# Patient Record
Sex: FEMALE | Race: WHITE | Employment: UNEMPLOYED | URBAN - NONMETROPOLITAN AREA
[De-identification: names, ages, dates, MRNs, and addresses within clinical notes are randomized per-mention and may not be internally consistent; named-entity substitution may affect disease eponyms.]

---

## 2017-06-02 ENCOUNTER — HOSPITAL ENCOUNTER (EMERGENCY)
Age: 27
Discharge: HOME | End: 2017-06-02
Attending: EMERGENCY MEDICINE
Payer: COMMERCIAL

## 2017-06-02 ENCOUNTER — APPOINTMENT (OUTPATIENT)
Dept: GENERAL RADIOLOGY | Age: 27
End: 2017-06-02
Payer: COMMERCIAL

## 2017-06-02 VITALS
RESPIRATION RATE: 24 BRPM | WEIGHT: 160 LBS | OXYGEN SATURATION: 100 % | TEMPERATURE: 98.8 F | DIASTOLIC BLOOD PRESSURE: 90 MMHG | SYSTOLIC BLOOD PRESSURE: 111 MMHG | BODY MASS INDEX: 28.34 KG/M2 | HEART RATE: 92 BPM

## 2017-06-02 DIAGNOSIS — J45.901 ASTHMA EXACERBATION: Primary | ICD-10-CM

## 2017-06-02 PROCEDURE — 6370000000 HC RX 637 (ALT 250 FOR IP): Performed by: EMERGENCY MEDICINE

## 2017-06-02 PROCEDURE — 94664 DEMO&/EVAL PT USE INHALER: CPT

## 2017-06-02 PROCEDURE — 99285 EMERGENCY DEPT VISIT HI MDM: CPT

## 2017-06-02 PROCEDURE — 71020 XR CHEST STANDARD TWO VW: CPT

## 2017-06-02 RX ORDER — IPRATROPIUM BROMIDE AND ALBUTEROL SULFATE 2.5; .5 MG/3ML; MG/3ML
2 SOLUTION RESPIRATORY (INHALATION) ONCE
Status: COMPLETED | OUTPATIENT
Start: 2017-06-02 | End: 2017-06-02

## 2017-06-02 RX ORDER — PREDNISONE 50 MG/1
50 TABLET ORAL DAILY
Qty: 4 TABLET | Refills: 0 | Status: SHIPPED | OUTPATIENT
Start: 2017-06-02 | End: 2018-12-12 | Stop reason: ALTCHOICE

## 2017-06-02 RX ORDER — PREDNISONE 20 MG/1
60 TABLET ORAL ONCE
Status: COMPLETED | OUTPATIENT
Start: 2017-06-02 | End: 2017-06-02

## 2017-06-02 RX ADMIN — IPRATROPIUM BROMIDE AND ALBUTEROL SULFATE 2 AMPULE: .5; 3 SOLUTION RESPIRATORY (INHALATION) at 12:53

## 2017-06-02 RX ADMIN — PREDNISONE 60 MG: 20 TABLET ORAL at 13:01

## 2017-06-02 ASSESSMENT — ENCOUNTER SYMPTOMS
ABDOMINAL PAIN: 0
TROUBLE SWALLOWING: 0
WHEEZING: 0
VOMITING: 0
NAUSEA: 0
PHOTOPHOBIA: 0
CHEST TIGHTNESS: 0
SHORTNESS OF BREATH: 1
FACIAL SWELLING: 0
BLOOD IN STOOL: 0
DIARRHEA: 0
SORE THROAT: 0
VOICE CHANGE: 0
COUGH: 0
BACK PAIN: 0

## 2017-06-02 ASSESSMENT — PAIN DESCRIPTION - DESCRIPTORS: DESCRIPTORS: ACHING

## 2017-06-02 ASSESSMENT — PAIN DESCRIPTION - LOCATION: LOCATION: CHEST

## 2017-06-02 ASSESSMENT — PAIN SCALES - GENERAL: PAINLEVEL_OUTOF10: 6

## 2017-06-02 ASSESSMENT — PAIN DESCRIPTION - PAIN TYPE: TYPE: ACUTE PAIN

## 2017-06-02 ASSESSMENT — PAIN DESCRIPTION - ORIENTATION: ORIENTATION: MID;ANTERIOR

## 2017-07-03 ENCOUNTER — HOSPITAL ENCOUNTER (OUTPATIENT)
Dept: NUTRITION | Age: 27
Discharge: HOME | End: 2017-07-03
Payer: COMMERCIAL

## 2017-07-03 PROCEDURE — 97802 MEDICAL NUTRITION INDIV IN: CPT

## 2017-07-05 VITALS — WEIGHT: 188.2 LBS | BODY MASS INDEX: 33.35 KG/M2 | HEIGHT: 63 IN

## 2018-11-05 ENCOUNTER — HOSPITAL ENCOUNTER (OUTPATIENT)
Age: 28
Discharge: HOME | End: 2018-11-05
Payer: COMMERCIAL

## 2018-11-05 LAB
-: ABNORMAL
ABO, EXTERNAL RESULT: NORMAL
ABO/RH: NORMAL
ABSOLUTE EOS #: 0.22 K/UL (ref 0–0.44)
ABSOLUTE IMMATURE GRANULOCYTE: 0.03 K/UL (ref 0–0.3)
ABSOLUTE LYMPH #: 2.06 K/UL (ref 1.1–3.7)
ABSOLUTE MONO #: 0.57 K/UL (ref 0.1–1.2)
AMORPHOUS: ABNORMAL
ANTIBODY SCREEN: NEGATIVE
BACTERIA: ABNORMAL
BASOPHILS # BLD: 0 % (ref 0–2)
BASOPHILS ABSOLUTE: <0.03 K/UL (ref 0–0.2)
BILIRUBIN URINE: NEGATIVE
C. TRACHOMATIS, EXTERNAL RESULT: NEGATIVE
CASTS UA: ABNORMAL /LPF
COLOR: YELLOW
COMMENT UA: ABNORMAL
CRYSTALS, UA: ABNORMAL /HPF
DIFFERENTIAL TYPE: ABNORMAL
EOSINOPHILS RELATIVE PERCENT: 3 % (ref 1–4)
EPITHELIAL CELLS UA: ABNORMAL /HPF (ref 0–25)
ESTIMATED AVERAGE GLUCOSE: 85 MG/DL
FREE THYROXINE INDEX: 13.2 UG/DL (ref 5–11)
GLUCOSE ADMINISTRATION: NORMAL
GLUCOSE TOLERANCE SCREEN 50G: 94 MG/DL (ref 70–135)
GLUCOSE URINE: NEGATIVE
HBA1C MFR BLD: 4.6 % (ref 4.8–5.9)
HCT VFR BLD CALC: 38.7 % (ref 36.3–47.1)
HEMOGLOBIN: 12.5 G/DL (ref 11.9–15.1)
HEP B, EXTERNAL RESULT: NONREACTIVE
HEPATITIS B SURFACE ANTIGEN: NONREACTIVE
HIV AG/AB: NONREACTIVE
HIV, EXTERNAL RESULT: NONREACTIVE
IMMATURE GRANULOCYTES: 0 %
KETONES, URINE: NEGATIVE
LEUKOCYTE ESTERASE, URINE: ABNORMAL
LYMPHOCYTES # BLD: 24 % (ref 24–43)
MCH RBC QN AUTO: 30.2 PG (ref 25.2–33.5)
MCHC RBC AUTO-ENTMCNC: 32.3 G/DL (ref 28.4–34.8)
MCV RBC AUTO: 93.5 FL (ref 82.6–102.9)
MONOCYTES # BLD: 7 % (ref 3–12)
MUCUS: ABNORMAL
N. GONORRHOEAE, EXTERNAL RESULT: NEGATIVE
NITRITE, URINE: NEGATIVE
NRBC AUTOMATED: 0 PER 100 WBC
OTHER OBSERVATIONS UA: ABNORMAL
PDW BLD-RTO: 13.1 % (ref 11.8–14.4)
PH UA: 6 (ref 5–9)
PLATELET # BLD: 272 K/UL (ref 138–453)
PLATELET ESTIMATE: ABNORMAL
PMV BLD AUTO: 12 FL (ref 8.1–13.5)
PROTEIN UA: NEGATIVE
RBC # BLD: 4.14 M/UL (ref 3.95–5.11)
RBC # BLD: ABNORMAL 10*6/UL
RBC UA: ABNORMAL /HPF (ref 0–2)
RENAL EPITHELIAL, UA: ABNORMAL /HPF
RHOGAM, EXTERNAL RESULT: POSITIVE
RPR, EXTERNAL RESULT: NONREACTIVE
RUBELLA TITER, EXTERNAL RESULT: NORMAL
RUBV IGG SER QL: 256.3 IU/ML
SEG NEUTROPHILS: 66 % (ref 36–65)
SEGMENTED NEUTROPHILS ABSOLUTE COUNT: 5.56 K/UL (ref 1.5–8.1)
SPECIFIC GRAVITY UA: 1.02 (ref 1.01–1.02)
T. PALLIDUM, IGG: NONREACTIVE
T3 TOTAL: 241 NG/DL (ref 80–200)
T4 TOTAL: 13.9 UG/DL (ref 4.5–12)
T4 TOTAL: 14.5 UG/DL (ref 4.5–12)
THYROXINE UPTAKE: 28 % (ref 28–41)
TRICHOMONAS: ABNORMAL
TURBIDITY: CLEAR
URINE HGB: NEGATIVE
UROBILINOGEN, URINE: NORMAL
WBC # BLD: 8.5 K/UL (ref 3.5–11.3)
WBC # BLD: ABNORMAL 10*3/UL
WBC UA: ABNORMAL /HPF (ref 0–5)
YEAST: ABNORMAL

## 2018-11-05 PROCEDURE — 84480 ASSAY TRIIODOTHYRONINE (T3): CPT

## 2018-11-05 PROCEDURE — 86900 BLOOD TYPING SEROLOGIC ABO: CPT

## 2018-11-05 PROCEDURE — 81001 URINALYSIS AUTO W/SCOPE: CPT

## 2018-11-05 PROCEDURE — 86762 RUBELLA ANTIBODY: CPT

## 2018-11-05 PROCEDURE — 85025 COMPLETE CBC W/AUTO DIFF WBC: CPT

## 2018-11-05 PROCEDURE — 87086 URINE CULTURE/COLONY COUNT: CPT

## 2018-11-05 PROCEDURE — 87340 HEPATITIS B SURFACE AG IA: CPT

## 2018-11-05 PROCEDURE — 36415 COLL VENOUS BLD VENIPUNCTURE: CPT

## 2018-11-05 PROCEDURE — 84436 ASSAY OF TOTAL THYROXINE: CPT

## 2018-11-05 PROCEDURE — 86780 TREPONEMA PALLIDUM: CPT

## 2018-11-05 PROCEDURE — 86901 BLOOD TYPING SEROLOGIC RH(D): CPT

## 2018-11-05 PROCEDURE — 82950 GLUCOSE TEST: CPT

## 2018-11-05 PROCEDURE — 87389 HIV-1 AG W/HIV-1&-2 AB AG IA: CPT

## 2018-11-05 PROCEDURE — 86850 RBC ANTIBODY SCREEN: CPT

## 2018-11-05 PROCEDURE — 84479 ASSAY OF THYROID (T3 OR T4): CPT

## 2018-11-05 PROCEDURE — 83036 HEMOGLOBIN GLYCOSYLATED A1C: CPT

## 2018-11-06 LAB
CULTURE: NORMAL
Lab: NORMAL
SPECIMEN DESCRIPTION: NORMAL
STATUS: NORMAL

## 2018-11-19 ENCOUNTER — HOSPITAL ENCOUNTER (OUTPATIENT)
Age: 28
Discharge: HOME | End: 2018-11-19
Payer: COMMERCIAL

## 2018-11-19 LAB
THYROXINE, FREE: 1.24 NG/DL (ref 0.93–1.7)
TSH SERPL DL<=0.05 MIU/L-ACNC: 0.41 MIU/L (ref 0.3–5)

## 2018-11-19 PROCEDURE — 84443 ASSAY THYROID STIM HORMONE: CPT

## 2018-11-19 PROCEDURE — 84439 ASSAY OF FREE THYROXINE: CPT

## 2018-11-19 PROCEDURE — 36415 COLL VENOUS BLD VENIPUNCTURE: CPT

## 2018-12-12 ENCOUNTER — HOSPITAL ENCOUNTER (EMERGENCY)
Age: 28
Discharge: HOME | End: 2018-12-12
Attending: EMERGENCY MEDICINE
Payer: COMMERCIAL

## 2018-12-12 VITALS
DIASTOLIC BLOOD PRESSURE: 86 MMHG | OXYGEN SATURATION: 99 % | HEART RATE: 100 BPM | SYSTOLIC BLOOD PRESSURE: 140 MMHG | TEMPERATURE: 97.4 F | RESPIRATION RATE: 18 BRPM

## 2018-12-12 DIAGNOSIS — E16.2 HYPOGLYCEMIA: Primary | ICD-10-CM

## 2018-12-12 LAB
-: ABNORMAL
AMORPHOUS: ABNORMAL
BACTERIA: ABNORMAL
BILIRUBIN URINE: NEGATIVE
CASTS UA: ABNORMAL /LPF
CHP ED QC CHECK: NORMAL
CHP ED QC CHECK: NORMAL
COLOR: YELLOW
COMMENT UA: ABNORMAL
CRYSTALS, UA: ABNORMAL /HPF
EPITHELIAL CELLS UA: ABNORMAL /HPF (ref 0–25)
GLUCOSE BLD-MCNC: 80 MG/DL
GLUCOSE BLD-MCNC: 80 MG/DL (ref 74–100)
GLUCOSE BLD-MCNC: 96 MG/DL
GLUCOSE BLD-MCNC: 96 MG/DL (ref 74–100)
GLUCOSE URINE: NEGATIVE
KETONES, URINE: ABNORMAL
LEUKOCYTE ESTERASE, URINE: NEGATIVE
MUCUS: ABNORMAL
NITRITE, URINE: NEGATIVE
OTHER OBSERVATIONS UA: ABNORMAL
PH UA: 6 (ref 5–9)
PROTEIN UA: NEGATIVE
RBC UA: ABNORMAL /HPF (ref 0–2)
RENAL EPITHELIAL, UA: ABNORMAL /HPF
SPECIFIC GRAVITY UA: <1.005 (ref 1.01–1.02)
TRICHOMONAS: ABNORMAL
TURBIDITY: CLEAR
URINE HGB: NEGATIVE
UROBILINOGEN, URINE: NORMAL
WBC UA: ABNORMAL /HPF (ref 0–5)
YEAST: ABNORMAL

## 2018-12-12 PROCEDURE — 99284 EMERGENCY DEPT VISIT MOD MDM: CPT

## 2018-12-12 PROCEDURE — 81001 URINALYSIS AUTO W/SCOPE: CPT

## 2018-12-12 PROCEDURE — 82947 ASSAY GLUCOSE BLOOD QUANT: CPT

## 2018-12-12 RX ORDER — ONDANSETRON 4 MG/1
4 TABLET, FILM COATED ORAL EVERY 8 HOURS PRN
COMMUNITY
End: 2021-08-25

## 2018-12-15 ASSESSMENT — ENCOUNTER SYMPTOMS
RESPIRATORY NEGATIVE: 1
GASTROINTESTINAL NEGATIVE: 1

## 2019-01-09 ENCOUNTER — HOSPITAL ENCOUNTER (OUTPATIENT)
Age: 29
Discharge: HOME | End: 2019-01-09
Payer: COMMERCIAL

## 2019-01-09 PROCEDURE — 85210 CLOT FACTOR II PROTHROM SPEC: CPT

## 2019-01-09 PROCEDURE — 85730 THROMBOPLASTIN TIME PARTIAL: CPT

## 2019-01-09 PROCEDURE — 81240 F2 GENE: CPT

## 2019-01-09 PROCEDURE — 36415 COLL VENOUS BLD VENIPUNCTURE: CPT

## 2019-01-09 PROCEDURE — 85301 ANTITHROMBIN III ANTIGEN: CPT

## 2019-01-09 PROCEDURE — 85610 PROTHROMBIN TIME: CPT

## 2019-01-09 PROCEDURE — 85613 RUSSELL VIPER VENOM DILUTED: CPT

## 2019-01-09 PROCEDURE — 86147 CARDIOLIPIN ANTIBODY EA IG: CPT

## 2019-01-09 PROCEDURE — 85306 CLOT INHIBIT PROT S FREE: CPT

## 2019-01-09 PROCEDURE — 85303 CLOT INHIBIT PROT C ACTIVITY: CPT

## 2019-01-09 PROCEDURE — 81291 MTHFR GENE: CPT

## 2019-01-09 PROCEDURE — 85220 BLOOC CLOT FACTOR V TEST: CPT

## 2019-01-11 LAB
ANTICARDIOLIPIN IGA ANTIBODY: 1.4 APU
ANTICARDIOLIPIN IGG ANTIBODY: 3.5 GPU
CARDIOLIPIN AB IGM: 15.4 MPU
DILUTE RUSSELL VIPER VENOM TIME: NORMAL
FACTOR II ACTIVITY: 195 % (ref 50–150)
FACTOR V ACTIVITY: 108 % (ref 50–150)
INR BLD: 1 (ref 0.9–1.2)
LUPUS ANTICOAG: NORMAL
PARTIAL THROMBOPLASTIN TIME: 32.2 SEC (ref 23.2–34.4)
PROTEIN S ACTIVITY: 56 % (ref 59–130)
PROTHROMBIN TIME: 10.2 SEC (ref 9.7–12.2)

## 2019-01-15 LAB — ANTI-THROMBIN 3 AG: 91 % (ref 82–136)

## 2019-01-17 LAB
MTHFR MUTATION 677T/A1298C: NORMAL
PROTHROMBIN G20210A MUTATION: NORMAL

## 2019-01-18 LAB — PROTEIN C ACTIVITY: 133 %

## 2019-01-28 ENCOUNTER — HOSPITAL ENCOUNTER (EMERGENCY)
Age: 29
Discharge: HOME | End: 2019-01-28
Attending: EMERGENCY MEDICINE
Payer: COMMERCIAL

## 2019-01-28 VITALS
SYSTOLIC BLOOD PRESSURE: 104 MMHG | OXYGEN SATURATION: 100 % | HEIGHT: 63 IN | DIASTOLIC BLOOD PRESSURE: 68 MMHG | BODY MASS INDEX: 31.18 KG/M2 | HEART RATE: 107 BPM | TEMPERATURE: 97.6 F | RESPIRATION RATE: 20 BRPM | WEIGHT: 176 LBS

## 2019-01-28 DIAGNOSIS — J40 BRONCHITIS: Primary | ICD-10-CM

## 2019-01-28 PROCEDURE — 99283 EMERGENCY DEPT VISIT LOW MDM: CPT

## 2019-01-28 PROCEDURE — 6370000000 HC RX 637 (ALT 250 FOR IP): Performed by: EMERGENCY MEDICINE

## 2019-01-28 RX ORDER — AZITHROMYCIN 250 MG/1
250 TABLET, FILM COATED ORAL DAILY
Qty: 4 TABLET | Refills: 0 | Status: SHIPPED | OUTPATIENT
Start: 2019-01-28 | End: 2019-02-01

## 2019-01-28 RX ORDER — AZITHROMYCIN 250 MG/1
500 TABLET, FILM COATED ORAL ONCE
Status: COMPLETED | OUTPATIENT
Start: 2019-01-28 | End: 2019-01-28

## 2019-01-28 RX ADMIN — AZITHROMYCIN 500 MG: 250 TABLET, FILM COATED ORAL at 03:31

## 2019-01-28 ASSESSMENT — PAIN DESCRIPTION - PAIN TYPE: TYPE: ACUTE PAIN

## 2019-01-28 ASSESSMENT — PAIN DESCRIPTION - LOCATION: LOCATION: FACE

## 2019-01-28 ASSESSMENT — PAIN SCALES - GENERAL: PAINLEVEL_OUTOF10: 6

## 2019-02-01 ENCOUNTER — HOSPITAL ENCOUNTER (OUTPATIENT)
Dept: ULTRASOUND IMAGING | Age: 29
Discharge: HOME | End: 2019-02-03
Payer: COMMERCIAL

## 2019-02-01 DIAGNOSIS — Z36.3 ENCOUNTER FOR ANTENATAL SCREENING FOR MALFORMATION USING ULTRASOUND: ICD-10-CM

## 2019-02-01 PROCEDURE — 76805 OB US >/= 14 WKS SNGL FETUS: CPT

## 2019-03-19 ENCOUNTER — HOSPITAL ENCOUNTER (OUTPATIENT)
Age: 29
Discharge: HOME | End: 2019-03-19
Attending: OBSTETRICS & GYNECOLOGY | Admitting: OBSTETRICS & GYNECOLOGY
Payer: COMMERCIAL

## 2019-03-19 VITALS
DIASTOLIC BLOOD PRESSURE: 80 MMHG | TEMPERATURE: 97.4 F | HEART RATE: 106 BPM | RESPIRATION RATE: 24 BRPM | SYSTOLIC BLOOD PRESSURE: 117 MMHG

## 2019-03-19 LAB
-: ABNORMAL
AMORPHOUS: ABNORMAL
BACTERIA: ABNORMAL
BILIRUBIN URINE: NEGATIVE
CASTS UA: ABNORMAL /LPF
COLOR: YELLOW
COMMENT UA: ABNORMAL
CRYSTALS, UA: ABNORMAL /HPF
EPITHELIAL CELLS UA: ABNORMAL /HPF (ref 0–25)
GLUCOSE URINE: NEGATIVE
KETONES, URINE: NEGATIVE
LEUKOCYTE ESTERASE, URINE: ABNORMAL
MUCUS: ABNORMAL
NITRITE, URINE: NEGATIVE
OTHER OBSERVATIONS UA: ABNORMAL
PH UA: 6 (ref 5–9)
PROTEIN UA: NEGATIVE
RBC UA: ABNORMAL /HPF (ref 0–2)
RENAL EPITHELIAL, UA: ABNORMAL /HPF
SPECIFIC GRAVITY UA: 1.02 (ref 1.01–1.02)
TRICHOMONAS: ABNORMAL
TURBIDITY: ABNORMAL
URINE HGB: NEGATIVE
UROBILINOGEN, URINE: NORMAL
WBC UA: ABNORMAL /HPF (ref 0–5)
YEAST: ABNORMAL

## 2019-03-19 PROCEDURE — 99213 OFFICE O/P EST LOW 20 MIN: CPT

## 2019-03-19 PROCEDURE — 81001 URINALYSIS AUTO W/SCOPE: CPT

## 2019-03-19 ASSESSMENT — PAIN DESCRIPTION - DESCRIPTORS: DESCRIPTORS: ACHING

## 2019-03-29 ENCOUNTER — HOSPITAL ENCOUNTER (OUTPATIENT)
Age: 29
Discharge: HOME | End: 2019-03-29
Payer: COMMERCIAL

## 2019-03-29 LAB
GLUCOSE ADMINISTRATION: 50
GLUCOSE TOLERANCE SCREEN 50G: 146 MG/DL (ref 70–135)
HCT VFR BLD CALC: 32.2 % (ref 36.3–47.1)
HEMOGLOBIN: 10.4 G/DL (ref 11.9–15.1)
MCH RBC QN AUTO: 29.5 PG (ref 25.2–33.5)
MCHC RBC AUTO-ENTMCNC: 32.3 G/DL (ref 28.4–34.8)
MCV RBC AUTO: 91.5 FL (ref 82.6–102.9)
NRBC AUTOMATED: 0 PER 100 WBC
PDW BLD-RTO: 13.3 % (ref 11.8–14.4)
PLATELET # BLD: 279 K/UL (ref 138–453)
PMV BLD AUTO: 11.1 FL (ref 8.1–13.5)
RBC # BLD: 3.52 M/UL (ref 3.95–5.11)
WBC # BLD: 13.2 K/UL (ref 3.5–11.3)

## 2019-03-29 PROCEDURE — 82950 GLUCOSE TEST: CPT

## 2019-03-29 PROCEDURE — 36415 COLL VENOUS BLD VENIPUNCTURE: CPT

## 2019-03-29 PROCEDURE — 85027 COMPLETE CBC AUTOMATED: CPT

## 2019-04-12 ENCOUNTER — HOSPITAL ENCOUNTER (OUTPATIENT)
Age: 29
Discharge: HOME | End: 2019-04-12
Attending: OBSTETRICS & GYNECOLOGY | Admitting: OBSTETRICS & GYNECOLOGY
Payer: COMMERCIAL

## 2019-04-12 ENCOUNTER — APPOINTMENT (OUTPATIENT)
Dept: ULTRASOUND IMAGING | Age: 29
End: 2019-04-12
Payer: COMMERCIAL

## 2019-04-12 VITALS
DIASTOLIC BLOOD PRESSURE: 65 MMHG | RESPIRATION RATE: 20 BRPM | SYSTOLIC BLOOD PRESSURE: 103 MMHG | HEART RATE: 85 BPM | TEMPERATURE: 97.9 F | WEIGHT: 169 LBS | BODY MASS INDEX: 29.95 KG/M2 | HEIGHT: 63 IN

## 2019-04-12 VITALS
RESPIRATION RATE: 18 BRPM | SYSTOLIC BLOOD PRESSURE: 105 MMHG | DIASTOLIC BLOOD PRESSURE: 68 MMHG | HEART RATE: 80 BPM | TEMPERATURE: 97.6 F

## 2019-04-12 PROBLEM — R10.9 ABDOMINAL PAIN: Status: ACTIVE | Noted: 2019-04-12

## 2019-04-12 LAB
-: ABNORMAL
ABSOLUTE EOS #: 0.21 K/UL (ref 0–0.44)
ABSOLUTE IMMATURE GRANULOCYTE: 0.12 K/UL (ref 0–0.3)
ABSOLUTE LYMPH #: 2.84 K/UL (ref 1.1–3.7)
ABSOLUTE MONO #: 0.67 K/UL (ref 0.1–1.2)
ALBUMIN SERPL-MCNC: 3.2 G/DL (ref 3.5–5.2)
ALBUMIN/GLOBULIN RATIO: 0.8 (ref 1–2.5)
ALP BLD-CCNC: 124 U/L (ref 35–104)
ALT SERPL-CCNC: 6 U/L (ref 5–33)
AMORPHOUS: ABNORMAL
AMYLASE: 63 U/L (ref 28–100)
AMYLASE: 75 U/L (ref 28–100)
ANION GAP SERPL CALCULATED.3IONS-SCNC: 13 MMOL/L (ref 9–17)
AST SERPL-CCNC: 9 U/L
BACTERIA: ABNORMAL
BASOPHILS # BLD: 0 % (ref 0–2)
BASOPHILS ABSOLUTE: 0.03 K/UL (ref 0–0.2)
BILIRUB SERPL-MCNC: 0.29 MG/DL (ref 0.3–1.2)
BILIRUBIN URINE: NEGATIVE
BUN BLDV-MCNC: 4 MG/DL (ref 6–20)
BUN/CREAT BLD: 9 (ref 9–20)
CALCIUM SERPL-MCNC: 8.6 MG/DL (ref 8.6–10.4)
CASTS UA: ABNORMAL /LPF
CHLORIDE BLD-SCNC: 101 MMOL/L (ref 98–107)
CO2: 21 MMOL/L (ref 20–31)
COLOR: YELLOW
COMMENT UA: ABNORMAL
CREAT SERPL-MCNC: 0.43 MG/DL (ref 0.5–0.9)
CRYSTALS, UA: ABNORMAL /HPF
DIFFERENTIAL TYPE: ABNORMAL
EOSINOPHILS RELATIVE PERCENT: 2 % (ref 1–4)
EPITHELIAL CELLS UA: ABNORMAL /HPF (ref 0–25)
GFR AFRICAN AMERICAN: >60 ML/MIN
GFR NON-AFRICAN AMERICAN: >60 ML/MIN
GFR SERPL CREATININE-BSD FRML MDRD: ABNORMAL ML/MIN/{1.73_M2}
GFR SERPL CREATININE-BSD FRML MDRD: ABNORMAL ML/MIN/{1.73_M2}
GLUCOSE BLD-MCNC: 84 MG/DL (ref 70–99)
GLUCOSE URINE: NEGATIVE
HCT VFR BLD CALC: 33.5 % (ref 36.3–47.1)
HEMOGLOBIN: 10.9 G/DL (ref 11.9–15.1)
IMMATURE GRANULOCYTES: 1 %
KETONES, URINE: ABNORMAL
LEUKOCYTE ESTERASE, URINE: ABNORMAL
LIPASE: 13 U/L (ref 13–60)
LIPASE: 13 U/L (ref 13–60)
LYMPHOCYTES # BLD: 21 % (ref 24–43)
MCH RBC QN AUTO: 28.8 PG (ref 25.2–33.5)
MCHC RBC AUTO-ENTMCNC: 32.5 G/DL (ref 28.4–34.8)
MCV RBC AUTO: 88.4 FL (ref 82.6–102.9)
MONOCYTES # BLD: 5 % (ref 3–12)
MUCUS: ABNORMAL
NITRITE, URINE: NEGATIVE
NRBC AUTOMATED: 0 PER 100 WBC
OTHER OBSERVATIONS UA: ABNORMAL
PDW BLD-RTO: 13.1 % (ref 11.8–14.4)
PH UA: 6 (ref 5–9)
PLATELET # BLD: 293 K/UL (ref 138–453)
PLATELET ESTIMATE: ABNORMAL
PMV BLD AUTO: 11.9 FL (ref 8.1–13.5)
POTASSIUM SERPL-SCNC: 3.3 MMOL/L (ref 3.7–5.3)
PROTEIN UA: NEGATIVE
RBC # BLD: 3.79 M/UL (ref 3.95–5.11)
RBC # BLD: ABNORMAL 10*6/UL
RBC UA: ABNORMAL /HPF (ref 0–2)
RENAL EPITHELIAL, UA: ABNORMAL /HPF
SEG NEUTROPHILS: 71 % (ref 36–65)
SEGMENTED NEUTROPHILS ABSOLUTE COUNT: 9.44 K/UL (ref 1.5–8.1)
SODIUM BLD-SCNC: 135 MMOL/L (ref 135–144)
SPECIFIC GRAVITY UA: 1.02 (ref 1.01–1.02)
TOTAL PROTEIN: 7.1 G/DL (ref 6.4–8.3)
TRICHOMONAS: ABNORMAL
TURBIDITY: ABNORMAL
URINE HGB: NEGATIVE
UROBILINOGEN, URINE: NORMAL
WBC # BLD: 13.3 K/UL (ref 3.5–11.3)
WBC # BLD: ABNORMAL 10*3/UL
WBC UA: ABNORMAL /HPF (ref 0–5)
YEAST: ABNORMAL

## 2019-04-12 PROCEDURE — 85025 COMPLETE CBC W/AUTO DIFF WBC: CPT

## 2019-04-12 PROCEDURE — 82150 ASSAY OF AMYLASE: CPT

## 2019-04-12 PROCEDURE — 2580000003 HC RX 258: Performed by: OBSTETRICS & GYNECOLOGY

## 2019-04-12 PROCEDURE — 76700 US EXAM ABDOM COMPLETE: CPT

## 2019-04-12 PROCEDURE — 36415 COLL VENOUS BLD VENIPUNCTURE: CPT

## 2019-04-12 PROCEDURE — 6360000002 HC RX W HCPCS: Performed by: OBSTETRICS & GYNECOLOGY

## 2019-04-12 PROCEDURE — 76817 TRANSVAGINAL US OBSTETRIC: CPT

## 2019-04-12 PROCEDURE — 76805 OB US >/= 14 WKS SNGL FETUS: CPT

## 2019-04-12 PROCEDURE — 80053 COMPREHEN METABOLIC PANEL: CPT

## 2019-04-12 PROCEDURE — 83690 ASSAY OF LIPASE: CPT

## 2019-04-12 PROCEDURE — 81001 URINALYSIS AUTO W/SCOPE: CPT

## 2019-04-12 PROCEDURE — 96374 THER/PROPH/DIAG INJ IV PUSH: CPT

## 2019-04-12 PROCEDURE — 99214 OFFICE O/P EST MOD 30 MIN: CPT

## 2019-04-12 RX ORDER — ONDANSETRON 2 MG/ML
4 INJECTION INTRAMUSCULAR; INTRAVENOUS EVERY 6 HOURS PRN
Status: DISCONTINUED | OUTPATIENT
Start: 2019-04-12 | End: 2019-04-12 | Stop reason: HOSPADM

## 2019-04-12 RX ORDER — SODIUM CHLORIDE, SODIUM LACTATE, POTASSIUM CHLORIDE, CALCIUM CHLORIDE 600; 310; 30; 20 MG/100ML; MG/100ML; MG/100ML; MG/100ML
INJECTION, SOLUTION INTRAVENOUS CONTINUOUS
Status: DISCONTINUED | OUTPATIENT
Start: 2019-04-12 | End: 2019-04-12 | Stop reason: HOSPADM

## 2019-04-12 RX ORDER — SODIUM CHLORIDE 0.9 % (FLUSH) 0.9 %
10 SYRINGE (ML) INJECTION PRN
Status: DISCONTINUED | OUTPATIENT
Start: 2019-04-12 | End: 2019-04-12 | Stop reason: HOSPADM

## 2019-04-12 RX ADMIN — ONDANSETRON 4 MG: 2 INJECTION INTRAMUSCULAR; INTRAVENOUS at 10:38

## 2019-04-12 RX ADMIN — SODIUM CHLORIDE, POTASSIUM CHLORIDE, SODIUM LACTATE AND CALCIUM CHLORIDE: 600; 310; 30; 20 INJECTION, SOLUTION INTRAVENOUS at 10:30

## 2019-04-12 ASSESSMENT — PAIN DESCRIPTION - DESCRIPTORS
DESCRIPTORS: CRAMPING;SHARP
DESCRIPTORS: ACHING
DESCRIPTORS: CRAMPING;SHARP

## 2019-04-12 NOTE — FLOWSHEET NOTE
Patient per wheelchair into the [facility] department stating left upper abdominal pain, sharp in nature at times, under left breast since 2000, increased in intensity since 0000. Patient also states lower abdominal cramping on and off and constant back pain. Patient states vomited times 3 with pain. Patient denies diarrhea or fever. Patient states has not been around anyone sick. Patient denies leaking of fluid or bleeding.   Patient into bathroom to obtain urine

## 2019-04-12 NOTE — FLOWSHEET NOTE
Talked with Dr Danni Burrell informed of Nitin 39, weeks of gestation, complaints of pain of upper abdomen left side under breast.  Pain to left side had intensified when patient was having mild contractions. Patient states sharp at intervals, initially rating a 7 when first into OB, now 4 to 5 on pain scale. Patient initially was having mild contractions, none seen presently, urine report,s vitals WNL, no temperature. Patient had vomited times 3 at home, none since in the [facility] department. Patient also complained of lower abdominal pain intermittent and constant back pain.

## 2019-04-12 NOTE — FLOWSHEET NOTE
Discharge instructions reviewed with patient and sign. Other. Pt verbalizes understanding. Aware Dr Willie Schmidt called in prescription to pharmacy. Pt denies any further questions/concerns. Discharge paperwork signed. Pt ambulates off unit.

## 2019-04-12 NOTE — FLOWSHEET NOTE
Talked with Dr Norman Bradford informed of CBC, CMP results of potassium level of 3.3,  Dr Norman Bradford okay with nurse talking with patient concerning foods high in potassium. vitals WNL, no temperature. No contractions. Patient sleeping.   Patient having minimal discomfort of left upper abdomen, orders received

## 2019-04-12 NOTE — FLOWSHEET NOTE
Pt to ob unit from dr dixon's office, orders received per dr Obdulio Stark prior to pt arrival. Pt co nausea/ vomiting, LUQ pain

## 2019-04-16 ENCOUNTER — HOSPITAL ENCOUNTER (OUTPATIENT)
Age: 29
Discharge: HOME | End: 2019-04-16
Attending: OBSTETRICS & GYNECOLOGY | Admitting: OBSTETRICS & GYNECOLOGY
Payer: COMMERCIAL

## 2019-04-16 VITALS
SYSTOLIC BLOOD PRESSURE: 108 MMHG | HEART RATE: 99 BPM | RESPIRATION RATE: 16 BRPM | TEMPERATURE: 97.6 F | DIASTOLIC BLOOD PRESSURE: 67 MMHG

## 2019-04-16 LAB
-: NORMAL
AMORPHOUS: NORMAL
BACTERIA: NORMAL
BILIRUBIN URINE: NEGATIVE
CASTS UA: NORMAL /LPF
COLOR: YELLOW
COMMENT UA: ABNORMAL
CRYSTALS, UA: NORMAL /HPF
EPITHELIAL CELLS UA: NORMAL /HPF (ref 0–25)
GLUCOSE URINE: NEGATIVE
KETONES, URINE: NEGATIVE
LEUKOCYTE ESTERASE, URINE: ABNORMAL
MUCUS: NORMAL
NITRITE, URINE: NEGATIVE
OTHER OBSERVATIONS UA: NORMAL
PH UA: 7 (ref 5–9)
PROTEIN UA: NEGATIVE
RBC UA: NORMAL /HPF (ref 0–2)
RENAL EPITHELIAL, UA: NORMAL /HPF
SPECIFIC GRAVITY UA: 1.01 (ref 1.01–1.02)
TRICHOMONAS: NORMAL
TURBIDITY: CLEAR
URINE HGB: NEGATIVE
UROBILINOGEN, URINE: NORMAL
WBC UA: NORMAL /HPF (ref 0–5)
YEAST: NORMAL

## 2019-04-16 PROCEDURE — 81001 URINALYSIS AUTO W/SCOPE: CPT

## 2019-04-17 NOTE — PROGRESS NOTES
Pt arrived to [place] department with c/o abdominal pain in LUQ. States that the sharp pains occur approximately every 7 minutes and then feels \"cramping/tightening\" sensations that \"wrap around from back and follow to LUQ. States this pain started approximately 1 hour again. States she has been feeling baby move as usual and denies vaginal bleeding/leaking at this time. Unable to void at this time. Pt states she will try to void as soon as she feels able.

## 2019-04-17 NOTE — PROGRESS NOTES
Discharge instructions given to pt and FOB. Both deny questions/concerns at this time. Wheelchair offered, pt denies need.

## 2019-04-17 NOTE — PROGRESS NOTES
Dr. Christine Castillo updated with pt's status and c/o. Informed of fetal tracing. Dr. Nell Harmon to speak to pt in room,, phone call transferred.

## 2019-04-18 ENCOUNTER — HOSPITAL ENCOUNTER (OUTPATIENT)
Age: 29
Discharge: HOME | End: 2019-04-18
Attending: OBSTETRICS & GYNECOLOGY | Admitting: OBSTETRICS & GYNECOLOGY
Payer: COMMERCIAL

## 2019-04-18 VITALS — DIASTOLIC BLOOD PRESSURE: 77 MMHG | SYSTOLIC BLOOD PRESSURE: 119 MMHG | HEART RATE: 123 BPM

## 2019-04-18 PROCEDURE — 59025 FETAL NON-STRESS TEST: CPT

## 2019-04-18 NOTE — FLOWSHEET NOTE
Dr Rajiv Falcon called and informed patient is having occasional contractions but is unaware of them and not feeling contractions. Orders received that patient may go home.

## 2019-04-18 NOTE — PROGRESS NOTES
Patient presents to L&D today for fetus small for gestational age    [de-identified] at 30.6 weeks     NST is reactive. Quality of tracing is satisfactory.

## 2019-04-22 ENCOUNTER — HOSPITAL ENCOUNTER (OUTPATIENT)
Age: 29
Discharge: HOME | End: 2019-04-22
Attending: OBSTETRICS & GYNECOLOGY | Admitting: OBSTETRICS & GYNECOLOGY
Payer: COMMERCIAL

## 2019-04-22 ENCOUNTER — HOSPITAL ENCOUNTER (OUTPATIENT)
Age: 29
Discharge: HOME | End: 2019-04-22
Payer: COMMERCIAL

## 2019-04-22 VITALS
SYSTOLIC BLOOD PRESSURE: 113 MMHG | HEART RATE: 97 BPM | DIASTOLIC BLOOD PRESSURE: 68 MMHG | RESPIRATION RATE: 18 BRPM | TEMPERATURE: 97.6 F

## 2019-04-22 LAB
AMOUNT GLUCOSE GIVEN: 100 G
GLUCOSE FASTING: 90 MG/DL (ref 65–99)
GLUCOSE TOLERANCE TEST 1 HOUR: NORMAL MG/DL (ref 65–184)
GLUCOSE TOLERANCE TEST 2 HOUR: NORMAL MG/DL (ref 65–139)
GLUCOSE TOLERANCE TEST 3 HOUR: NORMAL MG/DL (ref 65–130)

## 2019-04-22 PROCEDURE — 36415 COLL VENOUS BLD VENIPUNCTURE: CPT

## 2019-04-22 PROCEDURE — 82947 ASSAY GLUCOSE BLOOD QUANT: CPT

## 2019-04-22 PROCEDURE — 82951 GLUCOSE TOLERANCE TEST (GTT): CPT

## 2019-04-22 PROCEDURE — 59025 FETAL NON-STRESS TEST: CPT

## 2019-04-22 PROCEDURE — 82952 GTT-ADDED SAMPLES: CPT

## 2019-04-22 NOTE — PROGRESS NOTES
Patient presents to L&D today for fetus small for gestational age    [de-identified] at 31.3 weeks     NST is reactive. Quality of tracing is satisfactory.

## 2019-04-22 NOTE — FLOWSHEET NOTE
Dr Danni Burrell called and notified of reactive NST and that patient presents with a sore throat and a cough. States she can take robitussin and follow up in his office tomorrow. Orders received that patient may go home.

## 2019-04-24 ENCOUNTER — HOSPITAL ENCOUNTER (OUTPATIENT)
Age: 29
Discharge: HOME | End: 2019-04-24
Attending: OBSTETRICS & GYNECOLOGY | Admitting: OBSTETRICS & GYNECOLOGY
Payer: COMMERCIAL

## 2019-04-24 VITALS
SYSTOLIC BLOOD PRESSURE: 116 MMHG | RESPIRATION RATE: 18 BRPM | HEART RATE: 81 BPM | DIASTOLIC BLOOD PRESSURE: 64 MMHG | TEMPERATURE: 98.1 F

## 2019-04-24 PROCEDURE — 59025 FETAL NON-STRESS TEST: CPT

## 2019-04-24 NOTE — PROGRESS NOTES
Patient presents to L&D today for fetus small for gestational age    [de-identified] at 31+5 weeks     NST is reactive. Quality of tracing is satisfactory.

## 2019-04-30 ENCOUNTER — HOSPITAL ENCOUNTER (OUTPATIENT)
Dept: ULTRASOUND IMAGING | Age: 29
Discharge: HOME | End: 2019-05-02
Payer: COMMERCIAL

## 2019-04-30 ENCOUNTER — HOSPITAL ENCOUNTER (OUTPATIENT)
Age: 29
Discharge: HOME | End: 2019-04-30
Attending: OBSTETRICS & GYNECOLOGY | Admitting: OBSTETRICS & GYNECOLOGY
Payer: COMMERCIAL

## 2019-04-30 VITALS
RESPIRATION RATE: 16 BRPM | TEMPERATURE: 97.8 F | HEART RATE: 105 BPM | SYSTOLIC BLOOD PRESSURE: 108 MMHG | DIASTOLIC BLOOD PRESSURE: 72 MMHG

## 2019-04-30 PROCEDURE — 76819 FETAL BIOPHYS PROFIL W/O NST: CPT

## 2019-04-30 PROCEDURE — 59025 FETAL NON-STRESS TEST: CPT

## 2019-05-04 ENCOUNTER — HOSPITAL ENCOUNTER (OUTPATIENT)
Age: 29
Discharge: HOME | End: 2019-05-04
Attending: OBSTETRICS & GYNECOLOGY | Admitting: OBSTETRICS & GYNECOLOGY
Payer: COMMERCIAL

## 2019-05-04 VITALS
RESPIRATION RATE: 16 BRPM | SYSTOLIC BLOOD PRESSURE: 125 MMHG | HEART RATE: 100 BPM | TEMPERATURE: 97.7 F | DIASTOLIC BLOOD PRESSURE: 74 MMHG

## 2019-05-04 PROCEDURE — 59025 FETAL NON-STRESS TEST: CPT

## 2019-05-04 PROCEDURE — 59025 FETAL NON-STRESS TEST: CPT | Performed by: OBSTETRICS & GYNECOLOGY

## 2019-05-04 NOTE — FLOWSHEET NOTE
Patient presents to L&D today for fetus small for gestational age    [de-identified] at 33.1 weeks     NST is reactive. Quality of tracing is satisfactory.

## 2019-05-04 NOTE — FLOWSHEET NOTE
Discharge instructions reviewed with pt and questions answered at this time.  Ambulates out of unit with discharge instructions in hand

## 2019-05-04 NOTE — FLOWSHEET NOTE
Pt arrives ambulatory for biweekly NST. States that she is feeling \"crampy\" States she was just at [place] walking around and that's when she noticed the pain. States that she has had ligament pain before but \"this was different. It felt like pre period light cramps\". Vitals obtained and pt given button for NST. FOB in room.

## 2019-05-06 ENCOUNTER — HOSPITAL ENCOUNTER (OUTPATIENT)
Age: 29
Discharge: HOME | End: 2019-05-06
Attending: OBSTETRICS & GYNECOLOGY | Admitting: OBSTETRICS & GYNECOLOGY
Payer: COMMERCIAL

## 2019-05-06 VITALS
RESPIRATION RATE: 16 BRPM | TEMPERATURE: 97.7 F | DIASTOLIC BLOOD PRESSURE: 71 MMHG | WEIGHT: 169 LBS | SYSTOLIC BLOOD PRESSURE: 119 MMHG | HEART RATE: 94 BPM | BODY MASS INDEX: 29.95 KG/M2 | HEIGHT: 63 IN

## 2019-05-06 LAB
-: ABNORMAL
ABSOLUTE EOS #: 0.09 K/UL (ref 0–0.44)
ABSOLUTE IMMATURE GRANULOCYTE: 0.06 K/UL (ref 0–0.3)
ABSOLUTE LYMPH #: 2.24 K/UL (ref 1.1–3.7)
ABSOLUTE MONO #: 0.57 K/UL (ref 0.1–1.2)
ALBUMIN SERPL-MCNC: 3.2 G/DL (ref 3.5–5.2)
ALBUMIN/GLOBULIN RATIO: 0.8 (ref 1–2.5)
ALP BLD-CCNC: 158 U/L (ref 35–104)
ALT SERPL-CCNC: <5 U/L (ref 5–33)
AMORPHOUS: ABNORMAL
ANION GAP SERPL CALCULATED.3IONS-SCNC: 15 MMOL/L (ref 9–17)
AST SERPL-CCNC: 11 U/L
BACTERIA: ABNORMAL
BASOPHILS # BLD: 0 % (ref 0–2)
BASOPHILS ABSOLUTE: 0.03 K/UL (ref 0–0.2)
BILIRUB SERPL-MCNC: 0.36 MG/DL (ref 0.3–1.2)
BILIRUBIN URINE: ABNORMAL
BUN BLDV-MCNC: 4 MG/DL (ref 6–20)
BUN/CREAT BLD: 7 (ref 9–20)
CALCIUM SERPL-MCNC: 8.7 MG/DL (ref 8.6–10.4)
CASTS UA: ABNORMAL /LPF
CHLORIDE BLD-SCNC: 101 MMOL/L (ref 98–107)
CO2: 22 MMOL/L (ref 20–31)
COLOR: YELLOW
COMMENT UA: ABNORMAL
CREAT SERPL-MCNC: 0.57 MG/DL (ref 0.5–0.9)
CRYSTALS, UA: ABNORMAL /HPF
DIFFERENTIAL TYPE: ABNORMAL
EOSINOPHILS RELATIVE PERCENT: 1 % (ref 1–4)
EPITHELIAL CELLS UA: ABNORMAL /HPF (ref 0–25)
GFR AFRICAN AMERICAN: >60 ML/MIN
GFR NON-AFRICAN AMERICAN: >60 ML/MIN
GFR SERPL CREATININE-BSD FRML MDRD: ABNORMAL ML/MIN/{1.73_M2}
GFR SERPL CREATININE-BSD FRML MDRD: ABNORMAL ML/MIN/{1.73_M2}
GLUCOSE BLD-MCNC: 82 MG/DL (ref 70–99)
GLUCOSE URINE: NEGATIVE
HCT VFR BLD CALC: 35.4 % (ref 36.3–47.1)
HEMOGLOBIN: 11.3 G/DL (ref 11.9–15.1)
IMMATURE GRANULOCYTES: 0 %
KETONES, URINE: ABNORMAL
LEUKOCYTE ESTERASE, URINE: ABNORMAL
LYMPHOCYTES # BLD: 16 % (ref 24–43)
MCH RBC QN AUTO: 28.1 PG (ref 25.2–33.5)
MCHC RBC AUTO-ENTMCNC: 31.9 G/DL (ref 28.4–34.8)
MCV RBC AUTO: 88.1 FL (ref 82.6–102.9)
MONOCYTES # BLD: 4 % (ref 3–12)
MUCUS: ABNORMAL
NITRITE, URINE: NEGATIVE
NRBC AUTOMATED: 0 PER 100 WBC
OTHER OBSERVATIONS UA: ABNORMAL
PDW BLD-RTO: 13.2 % (ref 11.8–14.4)
PH UA: 6 (ref 5–9)
PLATELET # BLD: 304 K/UL (ref 138–453)
PLATELET ESTIMATE: ABNORMAL
PMV BLD AUTO: 11.8 FL (ref 8.1–13.5)
POTASSIUM SERPL-SCNC: 4.3 MMOL/L (ref 3.7–5.3)
PROTEIN UA: NEGATIVE
RBC # BLD: 4.02 M/UL (ref 3.95–5.11)
RBC # BLD: ABNORMAL 10*6/UL
RBC UA: ABNORMAL /HPF (ref 0–2)
RENAL EPITHELIAL, UA: ABNORMAL /HPF
SEG NEUTROPHILS: 79 % (ref 36–65)
SEGMENTED NEUTROPHILS ABSOLUTE COUNT: 10.78 K/UL (ref 1.5–8.1)
SODIUM BLD-SCNC: 138 MMOL/L (ref 135–144)
SPECIFIC GRAVITY UA: 1.02 (ref 1.01–1.02)
TOTAL PROTEIN: 7.4 G/DL (ref 6.4–8.3)
TRICHOMONAS: ABNORMAL
TURBIDITY: CLEAR
URINE HGB: NEGATIVE
UROBILINOGEN, URINE: NORMAL
WBC # BLD: 13.8 K/UL (ref 3.5–11.3)
WBC # BLD: ABNORMAL 10*3/UL
WBC UA: ABNORMAL /HPF (ref 0–5)
YEAST: ABNORMAL

## 2019-05-06 PROCEDURE — 80053 COMPREHEN METABOLIC PANEL: CPT

## 2019-05-06 PROCEDURE — 59025 FETAL NON-STRESS TEST: CPT

## 2019-05-06 PROCEDURE — 99215 OFFICE O/P EST HI 40 MIN: CPT

## 2019-05-06 PROCEDURE — 96361 HYDRATE IV INFUSION ADD-ON: CPT

## 2019-05-06 PROCEDURE — 2580000003 HC RX 258: Performed by: OBSTETRICS & GYNECOLOGY

## 2019-05-06 PROCEDURE — 87086 URINE CULTURE/COLONY COUNT: CPT

## 2019-05-06 PROCEDURE — 6360000002 HC RX W HCPCS: Performed by: OBSTETRICS & GYNECOLOGY

## 2019-05-06 PROCEDURE — 85025 COMPLETE CBC W/AUTO DIFF WBC: CPT

## 2019-05-06 PROCEDURE — 96360 HYDRATION IV INFUSION INIT: CPT

## 2019-05-06 PROCEDURE — 2500000003 HC RX 250 WO HCPCS: Performed by: OBSTETRICS & GYNECOLOGY

## 2019-05-06 PROCEDURE — 81001 URINALYSIS AUTO W/SCOPE: CPT

## 2019-05-06 PROCEDURE — 36415 COLL VENOUS BLD VENIPUNCTURE: CPT

## 2019-05-06 RX ORDER — ONDANSETRON 2 MG/ML
8 INJECTION INTRAMUSCULAR; INTRAVENOUS ONCE
Status: COMPLETED | OUTPATIENT
Start: 2019-05-06 | End: 2019-05-06

## 2019-05-06 RX ORDER — SODIUM CHLORIDE, SODIUM LACTATE, POTASSIUM CHLORIDE, CALCIUM CHLORIDE 600; 310; 30; 20 MG/100ML; MG/100ML; MG/100ML; MG/100ML
INJECTION, SOLUTION INTRAVENOUS CONTINUOUS
Status: DISCONTINUED | OUTPATIENT
Start: 2019-05-06 | End: 2019-05-06 | Stop reason: HOSPADM

## 2019-05-06 RX ADMIN — ONDANSETRON 8 MG: 2 INJECTION INTRAMUSCULAR; INTRAVENOUS at 13:05

## 2019-05-06 RX ADMIN — SODIUM CHLORIDE, POTASSIUM CHLORIDE, SODIUM LACTATE AND CALCIUM CHLORIDE: 600; 310; 30; 20 INJECTION, SOLUTION INTRAVENOUS at 13:00

## 2019-05-06 RX ADMIN — FAMOTIDINE 20 MG: 10 INJECTION, SOLUTION INTRAVENOUS at 13:06

## 2019-05-06 NOTE — FLOWSHEET NOTE
Dr Mcghee Nim notified of pts contx in the last hour and notified that she tolerated food orders obtained

## 2019-05-06 NOTE — FLOWSHEET NOTE
Pt arrives with complaints of pain since Saturday that has gotten worse and is causing her nausea. She states that she has puked 6 or 7 times. Since 0930 she has puked she only brought up mucous. The pain is in her upper abd that comes and goes and she is having lower abd that is constant.  She states she is voiding small quantities she is also also doing freqently voiding

## 2019-05-06 NOTE — PROGRESS NOTES
Patient presents to L&D today for fetus small for gestational age    [de-identified] at 35 3/7 weeks     NST is reactive. Quality of tracing is satisfactory.

## 2019-05-06 NOTE — FLOWSHEET NOTE
Dr Sapna Lujan called with pts complaints and about her urine results and her NST also notified of contx on monitor orders obtained

## 2019-05-06 NOTE — FLOWSHEET NOTE
Dr Magalys Hernadez called about cbc, cmp and about SVE also notified of contx orders obtained.  Ordered to have pt try cracks and sprite to see if she tolerates them

## 2019-05-07 ENCOUNTER — HOSPITAL ENCOUNTER (OUTPATIENT)
Age: 29
Discharge: HOME | End: 2019-05-07
Attending: OBSTETRICS & GYNECOLOGY | Admitting: OBSTETRICS & GYNECOLOGY
Payer: COMMERCIAL

## 2019-05-07 ENCOUNTER — HOSPITAL ENCOUNTER (OUTPATIENT)
Dept: ULTRASOUND IMAGING | Age: 29
Discharge: HOME | End: 2019-05-09
Payer: COMMERCIAL

## 2019-05-07 VITALS
TEMPERATURE: 98 F | HEART RATE: 88 BPM | RESPIRATION RATE: 20 BRPM | DIASTOLIC BLOOD PRESSURE: 75 MMHG | SYSTOLIC BLOOD PRESSURE: 104 MMHG

## 2019-05-07 VITALS
TEMPERATURE: 97.7 F | HEART RATE: 88 BPM | SYSTOLIC BLOOD PRESSURE: 115 MMHG | DIASTOLIC BLOOD PRESSURE: 77 MMHG | RESPIRATION RATE: 18 BRPM

## 2019-05-07 DIAGNOSIS — O41.03X1 OLIGOHYDRAMNIOS IN THIRD TRIMESTER, FETUS 1 OF MULTIPLE GESTATION: ICD-10-CM

## 2019-05-07 LAB
ABSOLUTE EOS #: 0.23 K/UL (ref 0–0.44)
ABSOLUTE IMMATURE GRANULOCYTE: 0.05 K/UL (ref 0–0.3)
ABSOLUTE LYMPH #: 3.06 K/UL (ref 1.1–3.7)
ABSOLUTE MONO #: 0.71 K/UL (ref 0.1–1.2)
BASOPHILS # BLD: 0 % (ref 0–2)
BASOPHILS ABSOLUTE: 0.03 K/UL (ref 0–0.2)
CULTURE: NORMAL
DIFFERENTIAL TYPE: ABNORMAL
EOSINOPHILS RELATIVE PERCENT: 2 % (ref 1–4)
HCT VFR BLD CALC: 31.5 % (ref 36.3–47.1)
HEMOGLOBIN: 10.4 G/DL (ref 11.9–15.1)
IMMATURE GRANULOCYTES: 0 %
LYMPHOCYTES # BLD: 27 % (ref 24–43)
Lab: NORMAL
MCH RBC QN AUTO: 28.5 PG (ref 25.2–33.5)
MCHC RBC AUTO-ENTMCNC: 33 G/DL (ref 28.4–34.8)
MCV RBC AUTO: 86.3 FL (ref 82.6–102.9)
MONOCYTES # BLD: 6 % (ref 3–12)
NRBC AUTOMATED: 0 PER 100 WBC
PDW BLD-RTO: 13.2 % (ref 11.8–14.4)
PLATELET # BLD: 259 K/UL (ref 138–453)
PLATELET ESTIMATE: ABNORMAL
PMV BLD AUTO: 12.6 FL (ref 8.1–13.5)
RBC # BLD: 3.65 M/UL (ref 3.95–5.11)
RBC # BLD: ABNORMAL 10*6/UL
SEG NEUTROPHILS: 65 % (ref 36–65)
SEGMENTED NEUTROPHILS ABSOLUTE COUNT: 7.25 K/UL (ref 1.5–8.1)
SPECIMEN DESCRIPTION: NORMAL
WBC # BLD: 11.3 K/UL (ref 3.5–11.3)
WBC # BLD: ABNORMAL 10*3/UL

## 2019-05-07 PROCEDURE — 99214 OFFICE O/P EST MOD 30 MIN: CPT

## 2019-05-07 PROCEDURE — 76818 FETAL BIOPHYS PROFILE W/NST: CPT

## 2019-05-07 PROCEDURE — 96372 THER/PROPH/DIAG INJ SC/IM: CPT

## 2019-05-07 PROCEDURE — 6360000002 HC RX W HCPCS: Performed by: OBSTETRICS & GYNECOLOGY

## 2019-05-07 PROCEDURE — 59025 FETAL NON-STRESS TEST: CPT

## 2019-05-07 PROCEDURE — 36415 COLL VENOUS BLD VENIPUNCTURE: CPT

## 2019-05-07 PROCEDURE — 85025 COMPLETE CBC W/AUTO DIFF WBC: CPT

## 2019-05-07 RX ORDER — PROMETHAZINE HYDROCHLORIDE 25 MG/ML
25 INJECTION, SOLUTION INTRAMUSCULAR; INTRAVENOUS ONCE
Status: COMPLETED | OUTPATIENT
Start: 2019-05-07 | End: 2019-05-07

## 2019-05-07 RX ORDER — NALBUPHINE HCL 10 MG/ML
10 AMPUL (ML) INJECTION ONCE
Status: COMPLETED | OUTPATIENT
Start: 2019-05-07 | End: 2019-05-07

## 2019-05-07 RX ORDER — NALBUPHINE HCL 10 MG/ML
10 AMPUL (ML) INJECTION ONCE
Status: DISCONTINUED | OUTPATIENT
Start: 2019-05-07 | End: 2019-05-07

## 2019-05-07 RX ORDER — MEPERIDINE HYDROCHLORIDE 25 MG/ML
25 INJECTION INTRAMUSCULAR; INTRAVENOUS; SUBCUTANEOUS ONCE
Status: DISCONTINUED | OUTPATIENT
Start: 2019-05-07 | End: 2019-05-07 | Stop reason: RX

## 2019-05-07 RX ADMIN — PROMETHAZINE HYDROCHLORIDE 25 MG: 25 INJECTION, SOLUTION INTRAMUSCULAR; INTRAVENOUS at 22:59

## 2019-05-07 RX ADMIN — NALBUPHINE HYDROCHLORIDE 10 MG: 10 INJECTION, SOLUTION INTRAMUSCULAR; INTRAVENOUS; SUBCUTANEOUS at 23:00

## 2019-05-07 ASSESSMENT — PAIN DESCRIPTION - DESCRIPTORS
DESCRIPTORS: SHARP
DESCRIPTORS: SHARP

## 2019-05-07 ASSESSMENT — PAIN SCALES - GENERAL: PAINLEVEL_OUTOF10: 8

## 2019-05-07 NOTE — FLOWSHEET NOTE
Pt stats that she was just here yesterday for dehydration. States that she is still having abdominal cramping rates 3/10. States that she overall feels better than yesterday. States that her zofran was refilled and that has helped with the nausea. FOB at bedside.

## 2019-05-08 ENCOUNTER — HOSPITAL ENCOUNTER (OUTPATIENT)
Age: 29
Discharge: HOME | End: 2019-05-08
Attending: OBSTETRICS & GYNECOLOGY | Admitting: OBSTETRICS & GYNECOLOGY
Payer: COMMERCIAL

## 2019-05-08 ENCOUNTER — APPOINTMENT (OUTPATIENT)
Dept: ULTRASOUND IMAGING | Age: 29
End: 2019-05-08
Payer: COMMERCIAL

## 2019-05-08 VITALS
SYSTOLIC BLOOD PRESSURE: 115 MMHG | TEMPERATURE: 97.8 F | HEART RATE: 93 BPM | DIASTOLIC BLOOD PRESSURE: 77 MMHG | RESPIRATION RATE: 16 BRPM

## 2019-05-08 PROCEDURE — 99215 OFFICE O/P EST HI 40 MIN: CPT

## 2019-05-08 PROCEDURE — 76770 US EXAM ABDO BACK WALL COMP: CPT

## 2019-05-08 PROCEDURE — 59025 FETAL NON-STRESS TEST: CPT

## 2019-05-08 NOTE — FLOWSHEET NOTE
Patient arrived with complaint of left flank pain radiating around front of abdomen. She states she has been seen here yesterday and today. Will review labs, etc., and call Dr Jarvis Davila for orders.

## 2019-05-08 NOTE — PROGRESS NOTES
Patient presents to L&D today for rule out uterine contractions    IUP at 33+5 weeks     NST is reactive. Quality of tracing is satisfactory.

## 2019-05-08 NOTE — FLOWSHEET NOTE
Patient states she no longer has any pain at all. Patient and her mom verbalize understanding of discharge instructions and patient states she will you warm rice sock intermittently and take tylenol as directed on the bottle as needed. Patient has no current issues/concerns.

## 2019-05-08 NOTE — FLOWSHEET NOTE
Pt to OB unit from Dr. Deedee Addison office with complaints of lower abdominal pain and bilateral flank pain. Pt reports decreased appetite and decreased urine output. Dr. Charlie Ferrer called prior to pt arrival with orders and states cervical exam was done in office. EFM applied, abdomen palpates soft and fetal movement noted. Plan of care discussed, pt verbalizes understanding of plan of care.

## 2019-05-13 ENCOUNTER — HOSPITAL ENCOUNTER (OUTPATIENT)
Age: 29
Discharge: HOME | End: 2019-05-14
Attending: OBSTETRICS & GYNECOLOGY | Admitting: OBSTETRICS & GYNECOLOGY
Payer: COMMERCIAL

## 2019-05-13 VITALS
TEMPERATURE: 97.6 F | SYSTOLIC BLOOD PRESSURE: 110 MMHG | DIASTOLIC BLOOD PRESSURE: 71 MMHG | RESPIRATION RATE: 16 BRPM | HEART RATE: 75 BPM

## 2019-05-13 PROBLEM — O36.8190 DECREASED FETAL MOVEMENTS: Status: ACTIVE | Noted: 2019-05-13

## 2019-05-13 PROCEDURE — 59025 FETAL NON-STRESS TEST: CPT

## 2019-05-13 NOTE — LETTER
[address] [facility]  [facility]  Phone: [phone]          May 14, 2019     Patient: Usama Dennison   YOB: 1990   Date of Visit: 5/8/2019       To Whom it May Concern:    June Pearsonmary was seen in my clinic on 5.13.19 -5.14.19 from 2330 until 0010. Her significant other was present during entire visit. If you have any questions or concerns, please don't hesitate to call.     Sincerely,         Katerine Lawson OB

## 2019-05-14 ENCOUNTER — HOSPITAL ENCOUNTER (OUTPATIENT)
Age: 29
Setting detail: OBSERVATION
Discharge: HOME | End: 2019-05-15
Attending: OBSTETRICS & GYNECOLOGY | Admitting: OBSTETRICS & GYNECOLOGY
Payer: COMMERCIAL

## 2019-05-14 ENCOUNTER — HOSPITAL ENCOUNTER (OUTPATIENT)
Dept: ULTRASOUND IMAGING | Age: 29
Discharge: HOME | End: 2019-05-16
Payer: COMMERCIAL

## 2019-05-14 DIAGNOSIS — O41.03X1 OLIGOHYDRAMNIOS IN THIRD TRIMESTER, FETUS 1 OF MULTIPLE GESTATION: ICD-10-CM

## 2019-05-14 PROBLEM — O36.8190 ABSENCE OF FETAL MOVEMENT: Status: ACTIVE | Noted: 2019-05-14

## 2019-05-14 PROCEDURE — 6370000000 HC RX 637 (ALT 250 FOR IP): Performed by: OBSTETRICS & GYNECOLOGY

## 2019-05-14 PROCEDURE — G0378 HOSPITAL OBSERVATION PER HR: HCPCS

## 2019-05-14 PROCEDURE — 59025 FETAL NON-STRESS TEST: CPT

## 2019-05-14 PROCEDURE — 76818 FETAL BIOPHYS PROFILE W/NST: CPT

## 2019-05-14 RX ORDER — SODIUM CHLORIDE 0.9 % (FLUSH) 0.9 %
10 SYRINGE (ML) INJECTION PRN
Status: DISCONTINUED | OUTPATIENT
Start: 2019-05-14 | End: 2019-05-14

## 2019-05-14 RX ORDER — SODIUM CHLORIDE 0.9 % (FLUSH) 0.9 %
10 SYRINGE (ML) INJECTION EVERY 12 HOURS SCHEDULED
Status: DISCONTINUED | OUTPATIENT
Start: 2019-05-14 | End: 2019-05-14

## 2019-05-14 RX ORDER — ALBUTEROL SULFATE 2.5 MG/3ML
2.5 SOLUTION RESPIRATORY (INHALATION) EVERY 6 HOURS PRN
Status: DISCONTINUED | OUTPATIENT
Start: 2019-05-14 | End: 2019-05-15 | Stop reason: HOSPADM

## 2019-05-14 RX ORDER — ACETAMINOPHEN 325 MG/1
650 TABLET ORAL EVERY 4 HOURS PRN
Status: DISCONTINUED | OUTPATIENT
Start: 2019-05-14 | End: 2019-05-15 | Stop reason: HOSPADM

## 2019-05-14 RX ORDER — ZOLPIDEM TARTRATE 5 MG/1
5 TABLET ORAL ONCE
Status: COMPLETED | OUTPATIENT
Start: 2019-05-14 | End: 2019-05-14

## 2019-05-14 RX ORDER — ONDANSETRON 2 MG/ML
4 INJECTION INTRAMUSCULAR; INTRAVENOUS EVERY 6 HOURS PRN
Status: DISCONTINUED | OUTPATIENT
Start: 2019-05-14 | End: 2019-05-14

## 2019-05-14 RX ADMIN — ZOLPIDEM TARTRATE 5 MG: 5 TABLET ORAL at 21:25

## 2019-05-14 NOTE — H&P
Department of Obstetrics and Gynecology  Dr. Keara Oakley   Observation History and Physical        CHIEF COMPLAINT:  No fetal movements    HISTORY OF PRESENT ILLNESS:      The patient is a 25 y.o. female at 31w1d.   OB History        2    Para        Term                AB   1    Living           SAB   1    TAB        Ectopic        Molar        Multiple        Live Births                Patient presents with a chief complaint as above and is being admitted for observation no fetal movements    Estimated Due Date: Estimated Date of Delivery: 19    PRENATAL CARE:    Complicated by: bronchial asthma, maternal hydroneprosis    PAST OB HISTORY:  OB History        2    Para        Term                AB   1    Living           SAB   1    TAB        Ectopic        Molar        Multiple        Live Births                    Past Medical History:        Diagnosis Date    Asthma     Back injury     2 fractures, no sugery    Chronic fatigue syndrome     GERD (gastroesophageal reflux disease)     Mental disorder     anxiety    Migraine     PCOS (polycystic ovarian syndrome)     Shingles     patient states 2 years ago     Past Surgical History:        Procedure Laterality Date    APPENDECTOMY      TONSILLECTOMY      TONSILLECTOMY AND ADENOIDECTOMY      UPPER GASTROINTESTINAL ENDOSCOPY  2016    -bx,retained gastric contents     Allergies:  Pcn [penicillins]; Sulfa antibiotics; and Tape [place] tape]    Social History:    Social History     Socioeconomic History    Marital status: Single     Spouse name: Not on file    Number of children: Not on file    Years of education: Not on file    Highest education level: Not on file   Occupational History    Not on file   Social Needs    Financial resource strain: Not on file    Food insecurity:     Worry: Not on file     Inability: Not on file    Transportation needs:     Medical: Not on file     Non-medical: Not on file   Tobacco Use    Smoking status: Never Smoker    Smokeless tobacco: Never Used   Substance and Sexual Activity    Alcohol use: Not Currently     Comment: vomits with drinking    Drug use: Never    Sexual activity: Not on file   Lifestyle    Physical activity:     Days per week: Not on file     Minutes per session: Not on file    Stress: Not on file   Relationships    Social connections:     Talks on phone: Not on file     Gets together: Not on file     Attends Christianity service: Not on file     Active member of club or organization: Not on file     Attends meetings of clubs or organizations: Not on file     Relationship status: Not on file    Intimate partner violence:     Fear of current or ex partner: Not on file     Emotionally abused: Not on file     Physically abused: Not on file     Forced sexual activity: Not on file   Other Topics Concern    Not on file   Social History Narrative    Not on file     Family History:       Problem Relation Age of Onset    Heart Attack Mother     Stroke Father      Medications Prior to Admission:  Medications Prior to Admission: Budesonide-Formoterol Fumarate (SYMBICORT IN), Inhale 2 puffs into the lungs 2 times daily  ondansetron (ZOFRAN) 4 MG tablet, Take 4 mg by mouth every 8 hours as needed for Nausea or Vomiting  metFORMIN (GLUCOPHAGE) 500 MG tablet, Take 1,000 mg by mouth 2 times daily  acetaminophen (APAP EXTRA STRENGTH) 500 MG tablet, Take 2 tablets by mouth every 6 hours as needed for Pain  amphetamine-dextroamphetamine (ADDERALL XR) 20 MG XR capsule, Take 10 mg by mouth 2 times daily. Vanessa Birmingham albuterol (PROVENTIL HFA;VENTOLIN HFA) 108 (90 BASE) MCG/ACT inhaler, Inhale 2 puffs into the lungs every 6 hours as needed. albuterol (PROVENTIL) (2.5 MG/3ML) 0.083% nebulizer solution, Take 2.5 mg by nebulization every 6 hours as needed.     REVIEW OF SYSTEMS:    CONSTITUTIONAL:  negative  RESPIRATORY:  negative  CARDIOVASCULAR:  negative  GASTROINTESTINAL:

## 2019-05-14 NOTE — PROGRESS NOTES
Patient has been up to void. She asked if she could wash her hair and Dr. Celena Rhodes informed me that she is allow to shower.   She denies pain

## 2019-05-14 NOTE — FLOWSHEET NOTE
Pt. Arrived to unit ambulatory with complaints of decreased fetal movement, 34w3d. Educated on [address].

## 2019-05-14 NOTE — PROGRESS NOTES
Discharge instructions given, pt denies questions/concerns. Wheelchair offered, pt denies need. Pt and FOB left unit ambulatory to private vehicle.

## 2019-05-14 NOTE — FLOWSHEET NOTE
Patient states not feeling the baby move like she normally does. States she was here in the unit last night for decreased fetal movement and it has not got any better throughout the day. Occasional irregular ctx.

## 2019-05-15 VITALS
SYSTOLIC BLOOD PRESSURE: 120 MMHG | TEMPERATURE: 97.7 F | RESPIRATION RATE: 16 BRPM | DIASTOLIC BLOOD PRESSURE: 83 MMHG | HEART RATE: 80 BPM

## 2019-05-15 PROCEDURE — G0378 HOSPITAL OBSERVATION PER HR: HCPCS

## 2019-05-17 ENCOUNTER — HOSPITAL ENCOUNTER (OUTPATIENT)
Dept: ULTRASOUND IMAGING | Age: 29
Discharge: HOME | End: 2019-05-19
Payer: COMMERCIAL

## 2019-05-17 ENCOUNTER — HOSPITAL ENCOUNTER (OUTPATIENT)
Age: 29
Setting detail: OBSERVATION
Discharge: HOME | End: 2019-05-17
Attending: MIDWIFE | Admitting: MIDWIFE
Payer: COMMERCIAL

## 2019-05-17 ENCOUNTER — HOSPITAL ENCOUNTER (OUTPATIENT)
Age: 29
Discharge: HOME | End: 2019-05-17
Attending: MIDWIFE | Admitting: MIDWIFE
Payer: COMMERCIAL

## 2019-05-17 VITALS
RESPIRATION RATE: 18 BRPM | TEMPERATURE: 97.8 F | HEART RATE: 106 BPM | SYSTOLIC BLOOD PRESSURE: 113 MMHG | DIASTOLIC BLOOD PRESSURE: 77 MMHG

## 2019-05-17 VITALS
HEART RATE: 96 BPM | TEMPERATURE: 98 F | RESPIRATION RATE: 16 BRPM | DIASTOLIC BLOOD PRESSURE: 76 MMHG | SYSTOLIC BLOOD PRESSURE: 102 MMHG

## 2019-05-17 DIAGNOSIS — O41.03X1 OLIGOHYDRAMNIOS IN THIRD TRIMESTER, FETUS 1 OF MULTIPLE GESTATION: ICD-10-CM

## 2019-05-17 PROBLEM — N93.9 VAGINAL BLEEDING: Status: ACTIVE | Noted: 2019-05-17

## 2019-05-17 LAB
-: ABNORMAL
AMORPHOUS: ABNORMAL
BACTERIA: ABNORMAL
BILIRUBIN URINE: ABNORMAL
CASTS UA: ABNORMAL /LPF
COLOR: ABNORMAL
COMMENT UA: ABNORMAL
CRYSTALS, UA: ABNORMAL /HPF
EPITHELIAL CELLS UA: ABNORMAL /HPF (ref 0–25)
GLUCOSE URINE: NEGATIVE
KETONES, URINE: ABNORMAL
LEUKOCYTE ESTERASE, URINE: NEGATIVE
MUCUS: ABNORMAL
NITRITE, URINE: NEGATIVE
OTHER OBSERVATIONS UA: ABNORMAL
PH UA: 6 (ref 5–9)
PROTEIN UA: ABNORMAL
RBC UA: ABNORMAL /HPF (ref 0–2)
RENAL EPITHELIAL, UA: ABNORMAL /HPF
SPECIFIC GRAVITY UA: >1.03 (ref 1.01–1.02)
TRICHOMONAS: ABNORMAL
TURBIDITY: CLEAR
URINE HGB: NEGATIVE
UROBILINOGEN, URINE: ABNORMAL
WBC UA: ABNORMAL /HPF (ref 0–5)
YEAST: ABNORMAL

## 2019-05-17 PROCEDURE — 59025 FETAL NON-STRESS TEST: CPT

## 2019-05-17 PROCEDURE — G0378 HOSPITAL OBSERVATION PER HR: HCPCS

## 2019-05-17 PROCEDURE — 87086 URINE CULTURE/COLONY COUNT: CPT

## 2019-05-17 PROCEDURE — G0379 DIRECT REFER HOSPITAL OBSERV: HCPCS

## 2019-05-17 PROCEDURE — 76818 FETAL BIOPHYS PROFILE W/NST: CPT

## 2019-05-17 PROCEDURE — 81001 URINALYSIS AUTO W/SCOPE: CPT

## 2019-05-17 NOTE — FLOWSHEET NOTE
Patient presents to L&D today for SGA    IUP at 35 weeks     NST is reactive. Quality of tracing is satisfactory.

## 2019-05-18 LAB
CULTURE: NORMAL
Lab: NORMAL
SPECIMEN DESCRIPTION: NORMAL

## 2019-05-18 NOTE — FLOWSHEET NOTE
Patient arrived with complaint of vaginal bleeding at home. Obtaining clean void urine then will place on EFM.

## 2019-05-20 NOTE — PROGRESS NOTES
Hanna Madden is a 35 week gestation pt  Presenting with c/o vag spotting at home. Nst reactive. Vag exam done and no blood noted on glove with exam. Pt urine was sent for UA/C&S and it was dark but likely related to her known cholecystitis. Otherwise negative urine. Observed and no spotting noted. Reassurance given. Hydration encouraged. A: Vag spotting.  Reactive NST  P: Discharge home with instruction

## 2019-05-21 ENCOUNTER — HOSPITAL ENCOUNTER (OUTPATIENT)
Age: 29
Discharge: HOME | End: 2019-05-21
Attending: OBSTETRICS & GYNECOLOGY | Admitting: OBSTETRICS & GYNECOLOGY
Payer: COMMERCIAL

## 2019-05-21 ENCOUNTER — HOSPITAL ENCOUNTER (OUTPATIENT)
Dept: ULTRASOUND IMAGING | Age: 29
Discharge: HOME | End: 2019-05-23
Payer: COMMERCIAL

## 2019-05-21 VITALS
HEART RATE: 120 BPM | SYSTOLIC BLOOD PRESSURE: 125 MMHG | DIASTOLIC BLOOD PRESSURE: 81 MMHG | TEMPERATURE: 97.8 F | RESPIRATION RATE: 18 BRPM

## 2019-05-21 PROCEDURE — 76819 FETAL BIOPHYS PROFIL W/O NST: CPT

## 2019-05-21 PROCEDURE — 59025 FETAL NON-STRESS TEST: CPT

## 2019-05-21 NOTE — PROGRESS NOTES
Patient presents to L&D today for fetus small for gestational age    [de-identified] at 35+4 weeks     NST is reactive, nonreactive. Quality of tracing is satisfactory.

## 2019-05-21 NOTE — PROGRESS NOTES
Pt presents for scheduled NST. FHR: WNL, moderate variability, positive accels, negative decels   A: IUP at 35 weeks; Clinical Indication:  Small for gestational age  Interpretation: Reactive > 20 minutes  FU as scheduled or prn.

## 2019-05-21 NOTE — PROGRESS NOTES
NST  FHR: WNL, moderate variability, positive accels, negative decels   A: IUP at 35 weeks; Clinical Indication:  Vaginal spotting  Interpretation: Reactive > 20 minutes  FU as scheduled or prn.

## 2019-05-25 ENCOUNTER — HOSPITAL ENCOUNTER (OUTPATIENT)
Age: 29
Discharge: HOME | End: 2019-05-25
Attending: OBSTETRICS & GYNECOLOGY | Admitting: OBSTETRICS & GYNECOLOGY
Payer: COMMERCIAL

## 2019-05-25 VITALS — DIASTOLIC BLOOD PRESSURE: 75 MMHG | HEART RATE: 103 BPM | SYSTOLIC BLOOD PRESSURE: 112 MMHG

## 2019-05-25 PROCEDURE — 59025 FETAL NON-STRESS TEST: CPT

## 2019-05-25 NOTE — FLOWSHEET NOTE
Patient presents to L&D today for SGA    IUP at 36.1 weeks     NST is reactive. Quality of tracing is satisfactory.

## 2019-05-25 NOTE — PROGRESS NOTES
Patient here for biweekly NST. Pt reports + fetal movement. Pt states she had some vaginal spotting last Friday, denies having any spotting since last Friday. Pt states she has had contractions off and on but irregular. Pt states she has had headaches due to seaonal allergies. Pt denies seeing floaters, denies epigastric pain. Pt states she did not come yesterday for NST because she had one in [place] on Thursday and was tired and Dr. Dominga Mcfadden was aware, she spoke with him yesterday and he told her she could just come in today for NST.

## 2019-05-28 ENCOUNTER — HOSPITAL ENCOUNTER (OUTPATIENT)
Dept: ULTRASOUND IMAGING | Age: 29
Discharge: HOME | End: 2019-05-30
Payer: COMMERCIAL

## 2019-05-28 ENCOUNTER — HOSPITAL ENCOUNTER (OUTPATIENT)
Age: 29
Discharge: HOME | End: 2019-05-29
Attending: OBSTETRICS & GYNECOLOGY | Admitting: OBSTETRICS & GYNECOLOGY
Payer: COMMERCIAL

## 2019-05-28 ENCOUNTER — HOSPITAL ENCOUNTER (OUTPATIENT)
Age: 29
Discharge: HOME | End: 2019-05-28
Attending: OBSTETRICS & GYNECOLOGY | Admitting: OBSTETRICS & GYNECOLOGY
Payer: COMMERCIAL

## 2019-05-28 VITALS
RESPIRATION RATE: 16 BRPM | TEMPERATURE: 97.4 F | DIASTOLIC BLOOD PRESSURE: 74 MMHG | HEART RATE: 84 BPM | SYSTOLIC BLOOD PRESSURE: 114 MMHG | OXYGEN SATURATION: 91 %

## 2019-05-28 PROBLEM — R60.9 EDEMA: Status: ACTIVE | Noted: 2019-05-28

## 2019-05-28 LAB
-: ABNORMAL
AMORPHOUS: ABNORMAL
BACTERIA: ABNORMAL
BILIRUBIN URINE: NEGATIVE
CASTS UA: ABNORMAL /LPF
COLOR: YELLOW
COMMENT UA: ABNORMAL
CRYSTALS, UA: ABNORMAL /HPF
EPITHELIAL CELLS UA: ABNORMAL /HPF (ref 0–25)
GLUCOSE URINE: NEGATIVE
KETONES, URINE: ABNORMAL
LEUKOCYTE ESTERASE, URINE: ABNORMAL
MUCUS: ABNORMAL
NITRITE, URINE: NEGATIVE
OTHER OBSERVATIONS UA: ABNORMAL
PH UA: 6 (ref 5–9)
PROTEIN UA: NEGATIVE
RBC UA: ABNORMAL /HPF (ref 0–2)
RENAL EPITHELIAL, UA: ABNORMAL /HPF
SPECIFIC GRAVITY UA: 1.02 (ref 1.01–1.02)
TRICHOMONAS: ABNORMAL
TURBIDITY: CLEAR
URINE HGB: ABNORMAL
UROBILINOGEN, URINE: NORMAL
WBC UA: ABNORMAL /HPF (ref 0–5)
YEAST: ABNORMAL

## 2019-05-28 PROCEDURE — 99218 PR INITIAL OBSERVATION CARE/DAY 30 MINUTES: CPT | Performed by: OBSTETRICS & GYNECOLOGY

## 2019-05-28 PROCEDURE — 59025 FETAL NON-STRESS TEST: CPT

## 2019-05-28 PROCEDURE — 99213 OFFICE O/P EST LOW 20 MIN: CPT

## 2019-05-28 PROCEDURE — 81001 URINALYSIS AUTO W/SCOPE: CPT

## 2019-05-28 PROCEDURE — 76819 FETAL BIOPHYS PROFIL W/O NST: CPT

## 2019-05-28 PROCEDURE — 6370000000 HC RX 637 (ALT 250 FOR IP): Performed by: OBSTETRICS & GYNECOLOGY

## 2019-05-28 PROCEDURE — 59025 FETAL NON-STRESS TEST: CPT | Performed by: OBSTETRICS & GYNECOLOGY

## 2019-05-28 PROCEDURE — 87086 URINE CULTURE/COLONY COUNT: CPT

## 2019-05-28 RX ORDER — CIPROFLOXACIN 250 MG/1
500 TABLET, FILM COATED ORAL EVERY 12 HOURS SCHEDULED
Status: DISCONTINUED | OUTPATIENT
Start: 2019-05-29 | End: 2019-05-29 | Stop reason: HOSPADM

## 2019-05-28 RX ORDER — CIPROFLOXACIN 250 MG/1
500 TABLET, FILM COATED ORAL EVERY 12 HOURS SCHEDULED
Status: DISCONTINUED | OUTPATIENT
Start: 2019-05-28 | End: 2019-05-28

## 2019-05-28 RX ORDER — CIPROFLOXACIN 500 MG/1
500 TABLET, FILM COATED ORAL EVERY 12 HOURS SCHEDULED
Qty: 14 TABLET | Refills: 0 | Status: SHIPPED | OUTPATIENT
Start: 2019-05-29 | End: 2019-06-05

## 2019-05-28 RX ADMIN — CIPROFLOXACIN 500 MG: 250 TABLET, FILM COATED ORAL at 23:13

## 2019-05-28 ASSESSMENT — PAIN DESCRIPTION - DESCRIPTORS
DESCRIPTORS: ACHING;CRAMPING
DESCRIPTORS: ACHING;CRAMPING

## 2019-05-28 NOTE — FLOWSHEET NOTE
Discharge instructions reviewed with pt. Verb understanding of self care and follow up care. Notified of signs and symptoms of labor.

## 2019-05-28 NOTE — PROGRESS NOTES
Pt arrives to unit with c/o all over pain. Pt states her \"urinary tubes are squished\" so that has been giving her pain and she also has gallbladder pain and she also was having ctx pain yesterday about 4 an hour. But then last night she woke up with lower abdominal pain and upper abdominal pain. Pt on EFM. Pt suppose to have NST and BPP today anyway for IUGR.

## 2019-05-28 NOTE — PROGRESS NOTES
Patient presents to L&D today for intrauterine growth retardation    IUP at 36+4 weeks     NST is reactive. Quality of tracing is satisfactory.

## 2019-05-29 VITALS
HEIGHT: 62 IN | DIASTOLIC BLOOD PRESSURE: 67 MMHG | TEMPERATURE: 97.7 F | RESPIRATION RATE: 18 BRPM | BODY MASS INDEX: 31.47 KG/M2 | SYSTOLIC BLOOD PRESSURE: 103 MMHG | WEIGHT: 171 LBS | HEART RATE: 90 BPM

## 2019-05-29 LAB
CULTURE: NORMAL
Lab: NORMAL
SPECIMEN DESCRIPTION: NORMAL

## 2019-05-29 PROCEDURE — 99214 OFFICE O/P EST MOD 30 MIN: CPT

## 2019-05-29 PROCEDURE — 59025 FETAL NON-STRESS TEST: CPT

## 2019-05-29 NOTE — FLOWSHEET NOTE
Patient presents to [facility] with complaints of contractions. Patient states starting cricket around 1730 tonight about every 5 to 6 minutes apart. Patient states feels in back and lower abdomen. Patient denies leaking fluid.   Patient states has had some pinkish mucus discharge today with wiping

## 2019-05-29 NOTE — FLOWSHEET NOTE
Talked with Dr Can Guzman informed of SVE results, small change from SVE done this afternoon,  Patient into [facility] department earlier today for NST and biophysical that was WNL. Patient has a IUGR baby as per Dr Willie Schmidt and has been evaluated in the last week in [facility] with doppler studies, with no issues. Patient's EDC and weeks of gestation. Patient into [facility] department tonight for contractions. Patient's contraction pattern anywhere from every 4 minutes to irregular at times. Informed of urine report. Also informed of fetal strip earlier 2 variables noted with FHR in the 160 range with patient using inhaler before coming into the [facility] department. Fetal strip has been reactive.   Orders received

## 2019-05-29 NOTE — FLOWSHEET NOTE
Dr Milagros Oakley in [facility] department, aware of SVE results, patient to be discharged

## 2019-05-29 NOTE — FLOWSHEET NOTE
Talked with Dr Hunter Hopson informed of patient's presence, EDC, weeks of gestation,  SVE, contraction pattern, urine report, Dr Hunter Hopson states is not in [place] presently, asks that we talk with Dr Marybeth Avila concerning this patient

## 2019-05-29 NOTE — FLOWSHEET NOTE
Discharge instructions given to patient. Instruction sheet given on Cipro and gone over with patient.   Patient verbalized understanding

## 2019-05-30 LAB — GBS, EXTERNAL RESULT: NEGATIVE

## 2019-06-01 ENCOUNTER — HOSPITAL ENCOUNTER (OUTPATIENT)
Age: 29
Discharge: HOME | End: 2019-06-01
Attending: OBSTETRICS & GYNECOLOGY | Admitting: OBSTETRICS & GYNECOLOGY
Payer: COMMERCIAL

## 2019-06-01 VITALS — TEMPERATURE: 97.5 F | DIASTOLIC BLOOD PRESSURE: 73 MMHG | HEART RATE: 94 BPM | SYSTOLIC BLOOD PRESSURE: 119 MMHG

## 2019-06-01 VITALS
SYSTOLIC BLOOD PRESSURE: 116 MMHG | DIASTOLIC BLOOD PRESSURE: 77 MMHG | HEART RATE: 85 BPM | RESPIRATION RATE: 16 BRPM | TEMPERATURE: 97.8 F

## 2019-06-01 PROBLEM — Z34.90 TERM PREGNANCY: Status: ACTIVE | Noted: 2019-06-01

## 2019-06-01 LAB
BILIRUBIN URINE: NEGATIVE
COLOR: YELLOW
COMMENT UA: NORMAL
GLUCOSE URINE: NEGATIVE
KETONES, URINE: NEGATIVE
LEUKOCYTE ESTERASE, URINE: NEGATIVE
NITRITE, URINE: NEGATIVE
PH UA: 6.5 (ref 5–9)
PROTEIN UA: NEGATIVE
SPECIFIC GRAVITY UA: 1.02 (ref 1.01–1.02)
TURBIDITY: CLEAR
URINE HGB: NEGATIVE
UROBILINOGEN, URINE: NORMAL

## 2019-06-01 PROCEDURE — 59025 FETAL NON-STRESS TEST: CPT

## 2019-06-01 PROCEDURE — 81003 URINALYSIS AUTO W/O SCOPE: CPT

## 2019-06-01 PROCEDURE — 99213 OFFICE O/P EST LOW 20 MIN: CPT

## 2019-06-01 NOTE — PROGRESS NOTES
Patient presents to L&D today for intrauterine growth retardation    IUP at 37 1/7 weeks     NST is reactive. Quality of tracing is satisfactory.

## 2019-06-02 NOTE — FLOWSHEET NOTE
Pt. Arrived to unit via w/c with nurse from ER and family present, 37w1d. C/o contractions and pain.

## 2019-06-02 NOTE — FLOWSHEET NOTE
Dr. Shirley Nicholas notified of patient status, labs, fetal strip/ctx. And pain. Ok'd to d/c and Dr. Pearl Velasquez to talk to patient to reassure her, call transferred in to room.

## 2019-06-02 NOTE — FLOWSHEET NOTE
POC explained to patient, patient states no changes to medical history since earlier visit today. NST initiaited. Pt. States she is unsure how to tell when the baby is moving due to so much other stuff going on. Also, patient states the baby is pushing on her belly so much she feels like she is \"breaking her belly button\". Writer educated on pressing button if feeling the baby \"pushing\" or any movement noted. Will continue to monitor.

## 2019-06-04 ENCOUNTER — HOSPITAL ENCOUNTER (OUTPATIENT)
Dept: ULTRASOUND IMAGING | Age: 29
Discharge: HOME | End: 2019-06-06
Payer: COMMERCIAL

## 2019-06-04 ENCOUNTER — HOSPITAL ENCOUNTER (OUTPATIENT)
Age: 29
Discharge: HOME | End: 2019-06-04
Attending: OBSTETRICS & GYNECOLOGY | Admitting: OBSTETRICS & GYNECOLOGY
Payer: COMMERCIAL

## 2019-06-04 VITALS
DIASTOLIC BLOOD PRESSURE: 62 MMHG | WEIGHT: 174 LBS | RESPIRATION RATE: 16 BRPM | SYSTOLIC BLOOD PRESSURE: 114 MMHG | TEMPERATURE: 97.6 F | HEIGHT: 62 IN | HEART RATE: 91 BPM | BODY MASS INDEX: 32.02 KG/M2

## 2019-06-04 PROCEDURE — 76819 FETAL BIOPHYS PROFIL W/O NST: CPT

## 2019-06-04 PROCEDURE — 59025 FETAL NON-STRESS TEST: CPT

## 2019-06-04 NOTE — FLOWSHEET NOTE
Dr Celena Rhodes on unit, reviewed NST and aware of BPP and NIKI. Orders received patient can be dc'd home.

## 2019-06-05 ENCOUNTER — APPOINTMENT (OUTPATIENT)
Dept: ULTRASOUND IMAGING | Age: 29
End: 2019-06-05
Payer: COMMERCIAL

## 2019-06-05 ENCOUNTER — HOSPITAL ENCOUNTER (OUTPATIENT)
Age: 29
Setting detail: OBSERVATION
Discharge: HOME | End: 2019-06-06
Attending: OBSTETRICS & GYNECOLOGY | Admitting: OBSTETRICS & GYNECOLOGY
Payer: COMMERCIAL

## 2019-06-05 PROBLEM — R11.2 NAUSEA & VOMITING: Status: ACTIVE | Noted: 2019-06-05

## 2019-06-05 LAB
-: ABNORMAL
ALBUMIN SERPL-MCNC: 3.1 G/DL (ref 3.5–5.2)
ALBUMIN/GLOBULIN RATIO: 0.8 (ref 1–2.5)
ALP BLD-CCNC: 149 U/L (ref 35–104)
ALT SERPL-CCNC: <5 U/L (ref 5–33)
AMORPHOUS: ABNORMAL
AMYLASE: 69 U/L (ref 28–100)
ANION GAP SERPL CALCULATED.3IONS-SCNC: 12 MMOL/L (ref 9–17)
AST SERPL-CCNC: 11 U/L
BACTERIA: ABNORMAL
BILIRUB SERPL-MCNC: 0.38 MG/DL (ref 0.3–1.2)
BILIRUBIN URINE: NEGATIVE
BUN BLDV-MCNC: 6 MG/DL (ref 6–20)
BUN/CREAT BLD: 11 (ref 9–20)
CALCIUM SERPL-MCNC: 8.8 MG/DL (ref 8.6–10.4)
CASTS UA: ABNORMAL /LPF
CHLORIDE BLD-SCNC: 101 MMOL/L (ref 98–107)
CO2: 20 MMOL/L (ref 20–31)
COLOR: YELLOW
COMMENT UA: ABNORMAL
CREAT SERPL-MCNC: 0.54 MG/DL (ref 0.5–0.9)
CRYSTALS, UA: ABNORMAL /HPF
EPITHELIAL CELLS UA: ABNORMAL /HPF (ref 0–25)
GFR AFRICAN AMERICAN: >60 ML/MIN
GFR NON-AFRICAN AMERICAN: >60 ML/MIN
GFR SERPL CREATININE-BSD FRML MDRD: ABNORMAL ML/MIN/{1.73_M2}
GFR SERPL CREATININE-BSD FRML MDRD: ABNORMAL ML/MIN/{1.73_M2}
GLUCOSE BLD-MCNC: 83 MG/DL (ref 70–99)
GLUCOSE URINE: NEGATIVE
HCT VFR BLD CALC: 33.6 % (ref 36.3–47.1)
HEMOGLOBIN: 10.8 G/DL (ref 11.9–15.1)
KETONES, URINE: ABNORMAL
LEUKOCYTE ESTERASE, URINE: ABNORMAL
LIPASE: 12 U/L (ref 13–60)
MCH RBC QN AUTO: 27.5 PG (ref 25.2–33.5)
MCHC RBC AUTO-ENTMCNC: 32.1 G/DL (ref 28.4–34.8)
MCV RBC AUTO: 85.5 FL (ref 82.6–102.9)
MUCUS: ABNORMAL
NITRITE, URINE: NEGATIVE
NRBC AUTOMATED: 0 PER 100 WBC
OTHER OBSERVATIONS UA: ABNORMAL
PDW BLD-RTO: 13.8 % (ref 11.8–14.4)
PH UA: 6 (ref 5–9)
PLATELET # BLD: 268 K/UL (ref 138–453)
PMV BLD AUTO: 11.5 FL (ref 8.1–13.5)
POTASSIUM SERPL-SCNC: 3.8 MMOL/L (ref 3.7–5.3)
PROTEIN UA: NEGATIVE
RBC # BLD: 3.93 M/UL (ref 3.95–5.11)
RBC UA: ABNORMAL /HPF (ref 0–2)
RENAL EPITHELIAL, UA: ABNORMAL /HPF
SODIUM BLD-SCNC: 133 MMOL/L (ref 135–144)
SPECIFIC GRAVITY UA: 1.02 (ref 1.01–1.02)
TOTAL PROTEIN: 6.9 G/DL (ref 6.4–8.3)
TRICHOMONAS: ABNORMAL
TURBIDITY: CLEAR
URINE HGB: ABNORMAL
UROBILINOGEN, URINE: NORMAL
WBC # BLD: 13.8 K/UL (ref 3.5–11.3)
WBC UA: ABNORMAL /HPF (ref 0–5)
YEAST: ABNORMAL

## 2019-06-05 PROCEDURE — 76818 FETAL BIOPHYS PROFILE W/NST: CPT

## 2019-06-05 PROCEDURE — 36415 COLL VENOUS BLD VENIPUNCTURE: CPT

## 2019-06-05 PROCEDURE — 85027 COMPLETE CBC AUTOMATED: CPT

## 2019-06-05 PROCEDURE — 96361 HYDRATE IV INFUSION ADD-ON: CPT

## 2019-06-05 PROCEDURE — 6370000000 HC RX 637 (ALT 250 FOR IP): Performed by: OBSTETRICS & GYNECOLOGY

## 2019-06-05 PROCEDURE — 82150 ASSAY OF AMYLASE: CPT

## 2019-06-05 PROCEDURE — 96374 THER/PROPH/DIAG INJ IV PUSH: CPT

## 2019-06-05 PROCEDURE — 6360000002 HC RX W HCPCS: Performed by: OBSTETRICS & GYNECOLOGY

## 2019-06-05 PROCEDURE — 81001 URINALYSIS AUTO W/SCOPE: CPT

## 2019-06-05 PROCEDURE — 76805 OB US >/= 14 WKS SNGL FETUS: CPT

## 2019-06-05 PROCEDURE — 2580000003 HC RX 258: Performed by: OBSTETRICS & GYNECOLOGY

## 2019-06-05 PROCEDURE — 96376 TX/PRO/DX INJ SAME DRUG ADON: CPT

## 2019-06-05 PROCEDURE — G0378 HOSPITAL OBSERVATION PER HR: HCPCS

## 2019-06-05 PROCEDURE — 80053 COMPREHEN METABOLIC PANEL: CPT

## 2019-06-05 PROCEDURE — 83690 ASSAY OF LIPASE: CPT

## 2019-06-05 PROCEDURE — 87086 URINE CULTURE/COLONY COUNT: CPT

## 2019-06-05 RX ORDER — SODIUM CHLORIDE, SODIUM LACTATE, POTASSIUM CHLORIDE, CALCIUM CHLORIDE 600; 310; 30; 20 MG/100ML; MG/100ML; MG/100ML; MG/100ML
INJECTION, SOLUTION INTRAVENOUS CONTINUOUS
Status: DISCONTINUED | OUTPATIENT
Start: 2019-06-05 | End: 2019-06-06 | Stop reason: HOSPADM

## 2019-06-05 RX ORDER — PROMETHAZINE HYDROCHLORIDE 25 MG/1
25 TABLET ORAL EVERY 4 HOURS PRN
Status: DISCONTINUED | OUTPATIENT
Start: 2019-06-05 | End: 2019-06-06 | Stop reason: HOSPADM

## 2019-06-05 RX ORDER — ONDANSETRON 2 MG/ML
4 INJECTION INTRAMUSCULAR; INTRAVENOUS EVERY 6 HOURS PRN
Status: DISCONTINUED | OUTPATIENT
Start: 2019-06-05 | End: 2019-06-05

## 2019-06-05 RX ORDER — ONDANSETRON 2 MG/ML
8 INJECTION INTRAMUSCULAR; INTRAVENOUS ONCE
Status: COMPLETED | OUTPATIENT
Start: 2019-06-05 | End: 2019-06-05

## 2019-06-05 RX ORDER — ACETAMINOPHEN 325 MG/1
650 TABLET ORAL EVERY 4 HOURS PRN
Status: DISCONTINUED | OUTPATIENT
Start: 2019-06-05 | End: 2019-06-05

## 2019-06-05 RX ORDER — ONDANSETRON 2 MG/ML
4 INJECTION INTRAMUSCULAR; INTRAVENOUS ONCE
Status: COMPLETED | OUTPATIENT
Start: 2019-06-05 | End: 2019-06-05

## 2019-06-05 RX ORDER — ONDANSETRON 2 MG/ML
8 INJECTION INTRAMUSCULAR; INTRAVENOUS EVERY 8 HOURS PRN
Status: DISCONTINUED | OUTPATIENT
Start: 2019-06-05 | End: 2019-06-06 | Stop reason: HOSPADM

## 2019-06-05 RX ORDER — SODIUM CHLORIDE 0.9 % (FLUSH) 0.9 %
10 SYRINGE (ML) INJECTION EVERY 12 HOURS SCHEDULED
Status: DISCONTINUED | OUTPATIENT
Start: 2019-06-05 | End: 2019-06-06 | Stop reason: HOSPADM

## 2019-06-05 RX ORDER — SODIUM CHLORIDE, SODIUM LACTATE, POTASSIUM CHLORIDE, CALCIUM CHLORIDE 600; 310; 30; 20 MG/100ML; MG/100ML; MG/100ML; MG/100ML
500 INJECTION, SOLUTION INTRAVENOUS ONCE
Status: COMPLETED | OUTPATIENT
Start: 2019-06-05 | End: 2019-06-05

## 2019-06-05 RX ORDER — SODIUM CHLORIDE 0.9 % (FLUSH) 0.9 %
10 SYRINGE (ML) INJECTION PRN
Status: DISCONTINUED | OUTPATIENT
Start: 2019-06-05 | End: 2019-06-05

## 2019-06-05 RX ADMIN — SODIUM CHLORIDE, POTASSIUM CHLORIDE, SODIUM LACTATE AND CALCIUM CHLORIDE: 600; 310; 30; 20 INJECTION, SOLUTION INTRAVENOUS at 08:46

## 2019-06-05 RX ADMIN — ONDANSETRON 4 MG: 2 INJECTION INTRAMUSCULAR; INTRAVENOUS at 09:15

## 2019-06-05 RX ADMIN — SODIUM CHLORIDE, POTASSIUM CHLORIDE, SODIUM LACTATE AND CALCIUM CHLORIDE 500 ML: 600; 310; 30; 20 INJECTION, SOLUTION INTRAVENOUS at 06:47

## 2019-06-05 RX ADMIN — PROMETHAZINE HYDROCHLORIDE 25 MG: 25 TABLET ORAL at 19:53

## 2019-06-05 RX ADMIN — ONDANSETRON 8 MG: 2 INJECTION INTRAMUSCULAR; INTRAVENOUS at 15:28

## 2019-06-05 RX ADMIN — SODIUM CHLORIDE, POTASSIUM CHLORIDE, SODIUM LACTATE AND CALCIUM CHLORIDE: 600; 310; 30; 20 INJECTION, SOLUTION INTRAVENOUS at 10:38

## 2019-06-05 RX ADMIN — ONDANSETRON 4 MG: 2 INJECTION INTRAMUSCULAR; INTRAVENOUS at 06:47

## 2019-06-05 NOTE — FLOWSHEET NOTE
Pt complaints of cramping pain at umbilicus. EFM monitor moved off of mid abdomen and pt states \"that's better\".

## 2019-06-05 NOTE — FLOWSHEET NOTE
US complete. Pt complains of continued nausea and \"I just don't feel good\". Dr. Christine Castillo called with US report and pt status update. No new orders at this time. Encourage oral fluids, pt requests chicken broth. Broth given and plan of care discussed, pt verbalizes understanding of plan of care.

## 2019-06-05 NOTE — PROGRESS NOTES
Dr. Gerhardt Kluver updated on patient arrival, FHR, complaint, SVE and pain level. New orders received at this time.

## 2019-06-05 NOTE — FLOWSHEET NOTE
Dr. Celena Rhodes called and updated on pt emesis. Order for NPO at this time and continued observation. MD states he will be in to see her late afternoon.

## 2019-06-05 NOTE — PROGRESS NOTES
Patient arrives to unit with complaint of N/V, abdominal pain and back pain. Vital signs obtained and fetal heart tones detected. Call light placed within reach.

## 2019-06-05 NOTE — FLOWSHEET NOTE
Pt tolerating sips of clear liquid diet at this time.  Sitting up watching television with significant other at bedside,

## 2019-06-05 NOTE — H&P
Department of Obstetrics and Gynecology  Dr. Fry Red   Observation History and Physical        CHIEF COMPLAINT:  Nausea and vomiting for 24 hours    HISTORY OF PRESENT ILLNESS:      The patient is a 25 y.o. female at 37w6d with nausea and vomiting for 24 hours. OB History        2    Para        Term                AB   1    Living           SAB   1    TAB        Ectopic        Molar        Multiple        Live Births                Patient presents with a chief complaint as above and is being admitted for IV hydration. Estimated Due Date: Estimated Date of Delivery: 19    PRENATAL CARE:    Complicated by: IUGR with good fetal surveillance tests.     PAST OB HISTORY:  OB History        2    Para        Term                AB   1    Living           SAB   1    TAB        Ectopic        Molar        Multiple        Live Births                    Past Medical History:        Diagnosis Date    Asthma     Back injury     2 fractures, no sugery    Chronic fatigue syndrome     GERD (gastroesophageal reflux disease)     Mental disorder     anxiety    PCOS (polycystic ovarian syndrome)     Shingles     patient states 2 years ago     Past Surgical History:        Procedure Laterality Date    APPENDECTOMY      TONSILLECTOMY      TONSILLECTOMY AND ADENOIDECTOMY      UPPER GASTROINTESTINAL ENDOSCOPY  2016    -bx,retained gastric contents     Allergies:  Pcn [penicillins]; Sulfa antibiotics; and Tape [place] tape]    Social History:    Social History     Socioeconomic History    Marital status: Single     Spouse name: Not on file    Number of children: Not on file    Years of education: Not on file    Highest education level: Not on file   Occupational History    Not on file   Social Needs    Financial resource strain: Not on file    Food insecurity:     Worry: Not on file     Inability: Not on file    Transportation needs:     Medical: Not on file negative  CARDIOVASCULAR:  negative  GASTROINTESTINAL:  negative  ALLERGIC/IMMUNOLOGIC:  negative  NEUROLOGICAL:  negative  BEHAVIOR/PSYCH:  negative    PHYSICAL EXAM:  Vitals:    06/05/19 0531 06/05/19 0718 06/05/19 1345   BP: 118/82 111/74 108/72   Pulse: 110 83 77   Resp: 14 16    Temp: 97.4 °F (36.3 °C) 98 °F (36.7 °C)    TempSrc: Oral Oral      General appearance:  awake, alert, cooperative, no apparent distress, and appears stated age  Neurologic:  Awake, alert, oriented to name, place and time. Lungs:  No increased work of breathing, good air exchange  Abdomen:  Soft, non tender, gravid, consistent with her gestational age, EFW by Leopald's manouever was VX   Fetal heart rate:  Reassuring. Pelvis:  Adequate pelvis  Cervix: Closed Long soft -3  Contraction frequency:  none    Membranes:  Intact    ASSESSMENT:  IUP 37 5/7 wks. IUGR with good Nsts, BPPs and umbilical cord doppler studies. Plan: IV hydration. Zofran Phenergan. Fetal monitoring.

## 2019-06-06 VITALS
TEMPERATURE: 98.1 F | RESPIRATION RATE: 16 BRPM | HEART RATE: 90 BPM | DIASTOLIC BLOOD PRESSURE: 63 MMHG | SYSTOLIC BLOOD PRESSURE: 133 MMHG

## 2019-06-06 LAB
CULTURE: NORMAL
Lab: NORMAL
SPECIMEN DESCRIPTION: NORMAL

## 2019-06-06 PROCEDURE — G0378 HOSPITAL OBSERVATION PER HR: HCPCS

## 2019-06-06 PROCEDURE — 2580000003 HC RX 258: Performed by: OBSTETRICS & GYNECOLOGY

## 2019-06-06 RX ADMIN — SODIUM CHLORIDE, POTASSIUM CHLORIDE, SODIUM LACTATE AND CALCIUM CHLORIDE: 600; 310; 30; 20 INJECTION, SOLUTION INTRAVENOUS at 00:36

## 2019-06-06 RX ADMIN — SODIUM CHLORIDE, POTASSIUM CHLORIDE, SODIUM LACTATE AND CALCIUM CHLORIDE: 600; 310; 30; 20 INJECTION, SOLUTION INTRAVENOUS at 07:53

## 2019-06-06 NOTE — FLOWSHEET NOTE
Pt states that she is hungry, requesting something more than crackers. Dr Sarai Ortiz called. States that pt can have a regular diet.

## 2019-06-06 NOTE — FLOWSHEET NOTE
Dr. Sarai Ortiz at bedside. Discharge discussed with pt. Follow up today with MFM in [facility].

## 2019-06-11 ENCOUNTER — ANESTHESIA EVENT (OUTPATIENT)
Dept: LABOR AND DELIVERY | Age: 29
DRG: 560 | End: 2019-06-11
Payer: COMMERCIAL

## 2019-06-11 ENCOUNTER — HOSPITAL ENCOUNTER (INPATIENT)
Age: 29
LOS: 2 days | Discharge: HOME | DRG: 560 | End: 2019-06-13
Attending: OBSTETRICS & GYNECOLOGY | Admitting: OBSTETRICS & GYNECOLOGY
Payer: COMMERCIAL

## 2019-06-11 ENCOUNTER — ANESTHESIA (OUTPATIENT)
Dept: LABOR AND DELIVERY | Age: 29
DRG: 560 | End: 2019-06-11
Payer: COMMERCIAL

## 2019-06-11 LAB
ABSOLUTE EOS #: 0.14 K/UL (ref 0–0.44)
ABSOLUTE IMMATURE GRANULOCYTE: 0.11 K/UL (ref 0–0.3)
ABSOLUTE LYMPH #: 2.56 K/UL (ref 1.1–3.7)
ABSOLUTE MONO #: 0.62 K/UL (ref 0.1–1.2)
AMPHETAMINE SCREEN URINE: NEGATIVE
BARBITURATE SCREEN URINE: NEGATIVE
BASOPHILS # BLD: 0 % (ref 0–2)
BASOPHILS ABSOLUTE: <0.03 K/UL (ref 0–0.2)
BENZODIAZEPINE SCREEN, URINE: NEGATIVE
BUPRENORPHINE URINE: NEGATIVE
CANNABINOID SCREEN URINE: NEGATIVE
COCAINE METABOLITE, URINE: NEGATIVE
DIFFERENTIAL TYPE: ABNORMAL
EOSINOPHILS RELATIVE PERCENT: 1 % (ref 1–4)
HCT VFR BLD CALC: 33.1 % (ref 36.3–47.1)
HEMOGLOBIN: 10.5 G/DL (ref 11.9–15.1)
IMMATURE GRANULOCYTES: 1 %
LYMPHOCYTES # BLD: 20 % (ref 24–43)
MCH RBC QN AUTO: 27.2 PG (ref 25.2–33.5)
MCHC RBC AUTO-ENTMCNC: 31.7 G/DL (ref 28.4–34.8)
MCV RBC AUTO: 85.8 FL (ref 82.6–102.9)
MDMA URINE: NORMAL
METHADONE SCREEN, URINE: NEGATIVE
METHAMPHETAMINE, URINE: NEGATIVE
MONOCYTES # BLD: 5 % (ref 3–12)
NRBC AUTOMATED: 0 PER 100 WBC
OPIATES, URINE: NEGATIVE
OXYCODONE SCREEN URINE: NEGATIVE
PDW BLD-RTO: 14.2 % (ref 11.8–14.4)
PHENCYCLIDINE, URINE: NEGATIVE
PLATELET # BLD: 273 K/UL (ref 138–453)
PLATELET ESTIMATE: ABNORMAL
PMV BLD AUTO: 11.7 FL (ref 8.1–13.5)
PROPOXYPHENE, URINE: NEGATIVE
RBC # BLD: 3.86 M/UL (ref 3.95–5.11)
RBC # BLD: ABNORMAL 10*6/UL
SEG NEUTROPHILS: 73 % (ref 36–65)
SEGMENTED NEUTROPHILS ABSOLUTE COUNT: 9.52 K/UL (ref 1.5–8.1)
TEST INFORMATION: NORMAL
TRICYCLIC ANTIDEPRESSANTS, UR: NEGATIVE
WBC # BLD: 13 K/UL (ref 3.5–11.3)
WBC # BLD: ABNORMAL 10*3/UL

## 2019-06-11 PROCEDURE — 6360000002 HC RX W HCPCS: Performed by: NURSE ANESTHETIST, CERTIFIED REGISTERED

## 2019-06-11 PROCEDURE — 80306 DRUG TEST PRSMV INSTRMNT: CPT

## 2019-06-11 PROCEDURE — 1220000000 HC SEMI PRIVATE OB R&B

## 2019-06-11 PROCEDURE — 3E033VJ INTRODUCTION OF OTHER HORMONE INTO PERIPHERAL VEIN, PERCUTANEOUS APPROACH: ICD-10-PCS | Performed by: OBSTETRICS & GYNECOLOGY

## 2019-06-11 PROCEDURE — 2580000003 HC RX 258: Performed by: OBSTETRICS & GYNECOLOGY

## 2019-06-11 PROCEDURE — 88307 TISSUE EXAM BY PATHOLOGIST: CPT

## 2019-06-11 PROCEDURE — G0379 DIRECT REFER HOSPITAL OBSERV: HCPCS

## 2019-06-11 PROCEDURE — 3700000025 EPIDURAL BLOCK: Performed by: NURSE ANESTHETIST, CERTIFIED REGISTERED

## 2019-06-11 PROCEDURE — 85025 COMPLETE CBC W/AUTO DIFF WBC: CPT

## 2019-06-11 PROCEDURE — 6360000002 HC RX W HCPCS: Performed by: OBSTETRICS & GYNECOLOGY

## 2019-06-11 PROCEDURE — 2500000003 HC RX 250 WO HCPCS: Performed by: NURSE ANESTHETIST, CERTIFIED REGISTERED

## 2019-06-11 PROCEDURE — G0378 HOSPITAL OBSERVATION PER HR: HCPCS

## 2019-06-11 RX ORDER — NALBUPHINE HCL 10 MG/ML
10 AMPUL (ML) INJECTION
Status: DISCONTINUED | OUTPATIENT
Start: 2019-06-11 | End: 2019-06-12

## 2019-06-11 RX ORDER — LIDOCAINE HYDROCHLORIDE 10 MG/ML
30 INJECTION, SOLUTION EPIDURAL; INFILTRATION; INTRACAUDAL; PERINEURAL PRN
Status: DISCONTINUED | OUTPATIENT
Start: 2019-06-11 | End: 2019-06-12

## 2019-06-11 RX ORDER — CARBOPROST TROMETHAMINE 250 UG/ML
250 INJECTION, SOLUTION INTRAMUSCULAR PRN
Status: DISCONTINUED | OUTPATIENT
Start: 2019-06-11 | End: 2019-06-12

## 2019-06-11 RX ORDER — ALBUTEROL SULFATE 90 UG/1
2 AEROSOL, METERED RESPIRATORY (INHALATION) EVERY 6 HOURS PRN
Status: DISCONTINUED | OUTPATIENT
Start: 2019-06-11 | End: 2019-06-13 | Stop reason: HOSPADM

## 2019-06-11 RX ORDER — MISOPROSTOL 100 UG/1
900 TABLET ORAL PRN
Status: DISCONTINUED | OUTPATIENT
Start: 2019-06-11 | End: 2019-06-12

## 2019-06-11 RX ORDER — ONDANSETRON 2 MG/ML
4 INJECTION INTRAMUSCULAR; INTRAVENOUS EVERY 6 HOURS PRN
Status: DISCONTINUED | OUTPATIENT
Start: 2019-06-11 | End: 2019-06-12

## 2019-06-11 RX ORDER — EPHEDRINE SULFATE 50 MG/ML
5 INJECTION, SOLUTION INTRAVENOUS EVERY 5 MIN PRN
Status: DISCONTINUED | OUTPATIENT
Start: 2019-06-11 | End: 2019-06-12

## 2019-06-11 RX ORDER — ROPIVACAINE HYDROCHLORIDE 2 MG/ML
INJECTION, SOLUTION EPIDURAL; INFILTRATION; PERINEURAL CONTINUOUS PRN
Status: DISCONTINUED | OUTPATIENT
Start: 2019-06-11 | End: 2019-06-12 | Stop reason: SDUPTHER

## 2019-06-11 RX ORDER — SODIUM CHLORIDE 0.9 % (FLUSH) 0.9 %
10 SYRINGE (ML) INJECTION EVERY 12 HOURS SCHEDULED
Status: DISCONTINUED | OUTPATIENT
Start: 2019-06-11 | End: 2019-06-12

## 2019-06-11 RX ORDER — SODIUM CHLORIDE 0.9 % (FLUSH) 0.9 %
10 SYRINGE (ML) INJECTION PRN
Status: DISCONTINUED | OUTPATIENT
Start: 2019-06-11 | End: 2019-06-12

## 2019-06-11 RX ORDER — ALBUTEROL SULFATE 2.5 MG/3ML
2.5 SOLUTION RESPIRATORY (INHALATION) EVERY 6 HOURS PRN
Status: DISCONTINUED | OUTPATIENT
Start: 2019-06-11 | End: 2019-06-13 | Stop reason: HOSPADM

## 2019-06-11 RX ORDER — ACETAMINOPHEN 325 MG/1
650 TABLET ORAL EVERY 4 HOURS PRN
Status: DISCONTINUED | OUTPATIENT
Start: 2019-06-11 | End: 2019-06-12

## 2019-06-11 RX ORDER — SODIUM CHLORIDE, SODIUM LACTATE, POTASSIUM CHLORIDE, CALCIUM CHLORIDE 600; 310; 30; 20 MG/100ML; MG/100ML; MG/100ML; MG/100ML
INJECTION, SOLUTION INTRAVENOUS CONTINUOUS
Status: DISCONTINUED | OUTPATIENT
Start: 2019-06-11 | End: 2019-06-12

## 2019-06-11 RX ORDER — ROPIVACAINE HYDROCHLORIDE 2 MG/ML
10 INJECTION, SOLUTION EPIDURAL; INFILTRATION; PERINEURAL CONTINUOUS
Status: DISCONTINUED | OUTPATIENT
Start: 2019-06-11 | End: 2019-06-12

## 2019-06-11 RX ORDER — SEVOFLURANE 250 ML/250ML
1 LIQUID RESPIRATORY (INHALATION) CONTINUOUS PRN
Status: DISCONTINUED | OUTPATIENT
Start: 2019-06-11 | End: 2019-06-12

## 2019-06-11 RX ORDER — LIDOCAINE HYDROCHLORIDE 20 MG/ML
INJECTION, SOLUTION EPIDURAL; INFILTRATION; INTRACAUDAL; PERINEURAL PRN
Status: DISCONTINUED | OUTPATIENT
Start: 2019-06-11 | End: 2019-06-12 | Stop reason: SDUPTHER

## 2019-06-11 RX ORDER — METHYLERGONOVINE MALEATE 0.2 MG/ML
200 INJECTION INTRAVENOUS PRN
Status: DISCONTINUED | OUTPATIENT
Start: 2019-06-11 | End: 2019-06-12

## 2019-06-11 RX ADMIN — SODIUM CHLORIDE, POTASSIUM CHLORIDE, SODIUM LACTATE AND CALCIUM CHLORIDE: 600; 310; 30; 20 INJECTION, SOLUTION INTRAVENOUS at 14:55

## 2019-06-11 RX ADMIN — OXYTOCIN-SODIUM CHLORIDE 0.9% IV SOLN 30 UNIT/500ML 1 MILLI-UNITS/MIN: 30-0.9/5 SOLUTION at 15:25

## 2019-06-11 RX ADMIN — LIDOCAINE HYDROCHLORIDE 5 ML: 20 INJECTION, SOLUTION EPIDURAL; INFILTRATION; INTRACAUDAL at 20:43

## 2019-06-11 RX ADMIN — LIDOCAINE HYDROCHLORIDE 3 ML: 20 INJECTION, SOLUTION EPIDURAL; INFILTRATION; INTRACAUDAL at 20:46

## 2019-06-11 RX ADMIN — ROPIVACAINE HYDROCHLORIDE 10 ML/HR: 2 INJECTION, SOLUTION EPIDURAL; INFILTRATION at 20:50

## 2019-06-11 RX ADMIN — SODIUM CHLORIDE, POTASSIUM CHLORIDE, SODIUM LACTATE AND CALCIUM CHLORIDE: 600; 310; 30; 20 INJECTION, SOLUTION INTRAVENOUS at 20:24

## 2019-06-11 RX ADMIN — NALBUPHINE HYDROCHLORIDE 10 MG: 10 INJECTION, SOLUTION INTRAMUSCULAR; INTRAVENOUS; SUBCUTANEOUS at 19:35

## 2019-06-11 RX ADMIN — LIDOCAINE HYDROCHLORIDE 2 ML: 20 INJECTION, SOLUTION EPIDURAL; INFILTRATION; INTRACAUDAL at 20:50

## 2019-06-11 ASSESSMENT — PAIN SCALES - GENERAL: PAINLEVEL_OUTOF10: 9

## 2019-06-11 ASSESSMENT — PAIN DESCRIPTION - DESCRIPTORS
DESCRIPTORS: CRAMPING
DESCRIPTORS: CRAMPING

## 2019-06-11 NOTE — PROGRESS NOTES
Pt arrives to unit for scheduled induction. Oriented to room.  Unable to provide urine specimen at this time states she just provided one at the office prior to coming over

## 2019-06-11 NOTE — PLAN OF CARE
Problem: Anxiety:  Goal: Level of anxiety will decrease  Description  Level of anxiety will decrease  Outcome: Ongoing     Problem: Breathing Pattern - Ineffective:  Goal: Able to breathe comfortably  Description  Able to breathe comfortably  Outcome: Ongoing     Problem: Fluid Volume - Imbalance:  Goal: Absence of imbalanced fluid volume signs and symptoms  Description  Absence of imbalanced fluid volume signs and symptoms  Outcome: Ongoing  Goal: Absence of intrapartum hemorrhage signs and symptoms  Description  Absence of intrapartum hemorrhage signs and symptoms  Outcome: Ongoing     Problem: Infection - Intrapartum Infection:  Goal: Will show no infection signs and symptoms  Description  Will show no infection signs and symptoms  Outcome: Ongoing     Problem: Labor Process - Prolonged:  Goal: Labor progression, first stage, within specified pattern  Description  Labor progression, first stage, within specified pattern  Outcome: Ongoing     Problem:  Screening:  Goal: Ability to make informed decisions regarding treatment has improved  Description  Ability to make informed decisions regarding treatment has improved  Outcome: Ongoing     Problem: Pain - Acute:  Goal: Pain level will decrease  Description  Pain level will decrease  Outcome: Ongoing  Goal: Able to cope with pain  Description  Able to cope with pain  Outcome: Ongoing     Problem: Tissue Perfusion - Uteroplacental, Altered:  Goal: Absence of abnormal fetal heart rate pattern  Description  Absence of abnormal fetal heart rate pattern  Outcome: Ongoing     Problem: Urinary Retention:  Goal: Experiences of bladder distention will decrease  Description  Experiences of bladder distention will decrease  Outcome: Ongoing  Goal: Urinary elimination within specified parameters  Description  Urinary elimination within specified parameters  Outcome: Ongoing

## 2019-06-11 NOTE — H&P
Department of Obstetrics and Gynecology   Obstetrics History and Physical        CHIEF COMPLAINT:  Induction of labor. IUGR. HISTORY OF PRESENT ILLNESS:      The patient is a 25 y.o. female at 38w3d. OB History        2    Para        Term                AB   1    Living           SAB   1    TAB        Ectopic        Molar        Multiple        Live Births                Patient presents for induction of labor, IUGR.     Estimated Due Date: Estimated Date of Delivery: 19    PRENATAL CARE:    Complicated by: IUGR    PAST OB HISTORY:  OB History        2    Para        Term                AB   1    Living           SAB   1    TAB        Ectopic        Molar        Multiple        Live Births                    Past Medical History:        Diagnosis Date    Asthma     Back injury     2 fractures, no sugery    Chronic fatigue syndrome     GERD (gastroesophageal reflux disease)     Mental disorder     anxiety    PCOS (polycystic ovarian syndrome)     Shingles     patient states 2 years ago     Past Surgical History:        Procedure Laterality Date    APPENDECTOMY      TONSILLECTOMY      TONSILLECTOMY AND ADENOIDECTOMY      UPPER GASTROINTESTINAL ENDOSCOPY  2016    -bx,retained gastric contents     Allergies:  Pcn [penicillins]; Sulfa antibiotics; and Tape [place] tape]    Social History:    Social History     Socioeconomic History    Marital status: Single     Spouse name: Not on file    Number of children: Not on file    Years of education: Not on file    Highest education level: Not on file   Occupational History    Not on file   Social Needs    Financial resource strain: Not on file    Food insecurity:     Worry: Not on file     Inability: Not on file    Transportation needs:     Medical: Not on file     Non-medical: Not on file   Tobacco Use    Smoking status: Never Smoker    Smokeless tobacco: Never Used   Substance and Sexual Activity    Alcohol use: Not Currently     Comment: vomits with drinking    Drug use: Never    Sexual activity: Yes     Partners: Male   Lifestyle    Physical activity:     Days per week: Not on file     Minutes per session: Not on file    Stress: Not on file   Relationships    Social connections:     Talks on phone: Not on file     Gets together: Not on file     Attends Jewish service: Not on file     Active member of club or organization: Not on file     Attends meetings of clubs or organizations: Not on file     Relationship status: Not on file    Intimate partner violence:     Fear of current or ex partner: Not on file     Emotionally abused: Not on file     Physically abused: Not on file     Forced sexual activity: Not on file   Other Topics Concern    Not on file   Social History Narrative    Not on file     Family History:       Problem Relation Age of Onset    Heart Attack Mother     Stroke Father      Medications Prior to Admission:  Medications Prior to Admission: Budesonide-Formoterol Fumarate (SYMBICORT IN), Inhale 2 puffs into the lungs 2 times daily  ondansetron (ZOFRAN) 4 MG tablet, Take 4 mg by mouth every 8 hours as needed for Nausea or Vomiting  acetaminophen (APAP EXTRA STRENGTH) 500 MG tablet, Take 2 tablets by mouth every 6 hours as needed for Pain  amphetamine-dextroamphetamine (ADDERALL XR) 20 MG XR capsule, Take 10 mg by mouth 2 times daily. Velora Sis albuterol (PROVENTIL HFA;VENTOLIN HFA) 108 (90 BASE) MCG/ACT inhaler, Inhale 2 puffs into the lungs every 6 hours as needed. albuterol (PROVENTIL) (2.5 MG/3ML) 0.083% nebulizer solution, Take 2.5 mg by nebulization every 6 hours as needed.     REVIEW OF SYSTEMS:    CONSTITUTIONAL:  negative  RESPIRATORY:  negative  CARDIOVASCULAR:  negative  GASTROINTESTINAL:  negative  ALLERGIC/IMMUNOLOGIC:  negative  NEUROLOGICAL:  negative  BEHAVIOR/PSYCH:  negative    PHYSICAL EXAM:  Vitals:    06/11/19 1414 06/11/19 1527 06/11/19 1627 06/11/19 1727   BP: 108/71 112/77 113/84 116/79   Pulse: 82 75 78 81   Resp: 18 18 18 16   Temp: 98.2 °F (36.8 °C)      TempSrc: Oral      Weight: 177 lb (80.3 kg)      Height: 5' 2\" (1.575 m)        General appearance:  awake, alert, cooperative, no apparent distress, and appears stated age  HEENT: ANA MARIA EOMI, trachea is midline, no thyroidmegaly  Heart: Regular rate and rhythm without murmur  Neurologic:  Awake, alert, oriented to name, place and time. Cranial nerves II-X11 grossly intact. Patellar DTR's 2/4 bilaterally. No clonus  Lungs:  No increased work of breathing, good air exchange  Abdomen:  Soft, non tender, gravid, consistent with her gestational age. Fundal height 38 CM  Fetal heart rate:  Reassuring, Cat 1, appropriate for gestational age  Pelvis:  Adequate pelvis  Cervix: 2 cm 25% soft -3  Contraction frequency:  0 minutes    Membranes:  Intact    Labs:   CBC:   Lab Results   Component Value Date    WBC 13.0 06/11/2019    RBC 3.86 06/11/2019    HGB 10.5 06/11/2019    HCT 33.1 06/11/2019    MCV 85.8 06/11/2019    MCH 27.2 06/11/2019    MCHC 31.7 06/11/2019    RDW 14.2 06/11/2019     06/11/2019    MPV 11.7 06/11/2019       NST is CAT-1.    ASSESSMENT: Term Intrauterine Pregnancy in for Induction of Labor due to IUGR, EFW less than 5%tile. PLAN: Admit, induction of labor protocol.        GBS: Negative

## 2019-06-12 PROCEDURE — 6370000000 HC RX 637 (ALT 250 FOR IP): Performed by: OBSTETRICS & GYNECOLOGY

## 2019-06-12 PROCEDURE — 1220000000 HC SEMI PRIVATE OB R&B

## 2019-06-12 PROCEDURE — 94664 DEMO&/EVAL PT USE INHALER: CPT

## 2019-06-12 PROCEDURE — 7200000001 HC VAGINAL DELIVERY

## 2019-06-12 RX ORDER — SODIUM CHLORIDE, SODIUM LACTATE, POTASSIUM CHLORIDE, CALCIUM CHLORIDE 600; 310; 30; 20 MG/100ML; MG/100ML; MG/100ML; MG/100ML
INJECTION, SOLUTION INTRAVENOUS CONTINUOUS
Status: DISCONTINUED | OUTPATIENT
Start: 2019-06-12 | End: 2019-06-13 | Stop reason: HOSPADM

## 2019-06-12 RX ORDER — DOCUSATE SODIUM 100 MG/1
100 CAPSULE, LIQUID FILLED ORAL 2 TIMES DAILY
Status: DISCONTINUED | OUTPATIENT
Start: 2019-06-12 | End: 2019-06-13 | Stop reason: HOSPADM

## 2019-06-12 RX ORDER — IBUPROFEN 800 MG/1
800 TABLET ORAL EVERY 8 HOURS
Status: DISCONTINUED | OUTPATIENT
Start: 2019-06-12 | End: 2019-06-13 | Stop reason: HOSPADM

## 2019-06-12 RX ORDER — LANOLIN 100 %
OINTMENT (GRAM) TOPICAL PRN
Status: DISCONTINUED | OUTPATIENT
Start: 2019-06-12 | End: 2019-06-13 | Stop reason: HOSPADM

## 2019-06-12 RX ORDER — SODIUM CHLORIDE 0.9 % (FLUSH) 0.9 %
10 SYRINGE (ML) INJECTION EVERY 12 HOURS SCHEDULED
Status: DISCONTINUED | OUTPATIENT
Start: 2019-06-12 | End: 2019-06-13 | Stop reason: HOSPADM

## 2019-06-12 RX ORDER — SODIUM CHLORIDE 0.9 % (FLUSH) 0.9 %
10 SYRINGE (ML) INJECTION PRN
Status: DISCONTINUED | OUTPATIENT
Start: 2019-06-12 | End: 2019-06-13 | Stop reason: HOSPADM

## 2019-06-12 RX ORDER — ACETAMINOPHEN 325 MG/1
650 TABLET ORAL EVERY 4 HOURS PRN
Status: DISCONTINUED | OUTPATIENT
Start: 2019-06-12 | End: 2019-06-13 | Stop reason: HOSPADM

## 2019-06-12 RX ORDER — METHYLERGONOVINE MALEATE 0.2 MG/ML
200 INJECTION INTRAVENOUS
Status: ACTIVE | OUTPATIENT
Start: 2019-06-12 | End: 2019-06-12

## 2019-06-12 RX ADMIN — DOCUSATE SODIUM 100 MG: 100 CAPSULE, LIQUID FILLED ORAL at 21:10

## 2019-06-12 RX ADMIN — IBUPROFEN 800 MG: 800 TABLET, FILM COATED ORAL at 21:10

## 2019-06-12 RX ADMIN — DOCUSATE SODIUM 100 MG: 100 CAPSULE, LIQUID FILLED ORAL at 08:50

## 2019-06-12 RX ADMIN — ALBUTEROL SULFATE 2 PUFF: 90 AEROSOL, METERED RESPIRATORY (INHALATION) at 10:22

## 2019-06-12 RX ADMIN — IBUPROFEN 800 MG: 800 TABLET, FILM COATED ORAL at 10:26

## 2019-06-12 RX ADMIN — Medication: at 02:48

## 2019-06-12 RX ADMIN — ACETAMINOPHEN 650 MG: 325 TABLET, FILM COATED ORAL at 16:35

## 2019-06-12 RX ADMIN — IBUPROFEN 800 MG: 800 TABLET, FILM COATED ORAL at 02:48

## 2019-06-12 RX ADMIN — BENZOCAINE AND LEVOMENTHOL: 200; 5 SPRAY TOPICAL at 02:48

## 2019-06-12 RX ADMIN — DOCUSATE SODIUM 100 MG: 100 CAPSULE, LIQUID FILLED ORAL at 02:52

## 2019-06-12 ASSESSMENT — PAIN SCALES - GENERAL
PAINLEVEL_OUTOF10: 5
PAINLEVEL_OUTOF10: 6
PAINLEVEL_OUTOF10: 4
PAINLEVEL_OUTOF10: 4

## 2019-06-12 ASSESSMENT — PAIN DESCRIPTION - DESCRIPTORS
DESCRIPTORS: ACHING;CRAMPING
DESCRIPTORS: ACHING
DESCRIPTORS: ACHING;CRAMPING
DESCRIPTORS: ACHING

## 2019-06-12 NOTE — FLOWSHEET NOTE
Talked with Dr Laverta Frankel informed of contraction pattern,  Nubain given with no relief from pain.   Patient requesting Epidural, Dr Laverta Frankel states okay for Epidural

## 2019-06-12 NOTE — FLOWSHEET NOTE
Patient very anxious, screaming, toco removed per patient request, states toco to tight.   Reassurance given to patient

## 2019-06-12 NOTE — L&D DELIVERY SUMMARY NOTE
Department of Obstetrics and Gynecology  Spontaneous Vaginal Delivery Note    Labor & Delivery Summary  Dilation Complete Date: 19  Dilation Complete Time:     Pre-operative Diagnosis:  Term pregnancy    Post-operative Diagnosis:  Same, delivered, Term single living child    Procedure:  Spontaneous vaginal delivery    Surgeon:  Barbara Fernandez M.D. This patient has no babies on file. Anesthesia:  epidural anesthesia    Estimated blood loss:  350ml    Specimen:  Placenta was sent for pathological analysis. Umbilical cord blood sent Yes    Umbilical cord blood gases: not required    Complications:  none    Condition:  infant stable to general nursery    Details of Procedure: The patient is a 25 y.o. female at 38w3d   OB History        2    Para        Term                AB   1    Living           SAB   1    TAB        Ectopic        Molar        Multiple        Live Births                 who was admitted for induction. She received the following interventions: IV Pitocin induction The patient progressed well,did receive an epidural, became complete and started to push. After pushing for 15 minutyes the fetal head was at the perineum,     a median episiotomy was performed. The nose and mouth were suctioned with bulb suction and the rest of the infant delivered atraumatically, placed on mother abdomen. The cord was clamped and cut and infant handed off to the waiting nurse for evaluation. The delivery of the placenta was spontaneous. The perineum and vagina were explored and no lacerations were seen. A median episiotomy was repaired in standard fashion.

## 2019-06-12 NOTE — ANESTHESIA PRE PROCEDURE
acetaminophen (TYLENOL) tablet 650 mg  650 mg Oral Q4H PRN Naa Acosta MD        oxytocin (PITOCIN) 30 units in 500 mL infusion  1 s[place]-units/min Intravenous Continuous Naa Acosta MD 6 mL/hr at 19 6 saskia-units/min at 19    ondansetron (ZOFRAN) injection 4 mg  4 mg Intravenous Q6H PRN Naa Acosta MD        oxytocin (PITOCIN) 30 units in 500 mL infusion  1 [place]-units/min Intravenous Continuous PRN Naa Acosta MD        methylergonovine (METHERGINE) injection 200 mcg  200 mcg Intramuscular PRN Naa Acosta MD        carboprost (HEMABATE) injection 250 mcg  250 mcg Intramuscular PRN Naa Acosta MD        misoprostol (CYTOTEC) tablet 900 mcg  900 mcg Rectal PRN Naa Acosta, MD       Jelly Moors witch hazel-glycerin (TUCKS) pad   Topical PRN Naa Acosta MD        benzocaine-menthol (DERMOPLAST) 20-0.5 % spray   Topical PRN Naa Acosta MD           Allergies:     Allergies   Allergen Reactions    Pcn [Penicillins] Other (See Comments)     'Throat closes\"    Sulfa Antibiotics Other (See Comments)     Throat closes\"    Tape Janece Cintia Tape] Rash     Papertape       Problem List:    Patient Active Problem List   Diagnosis Code    Dyspepsia R10.13    Vomiting without nausea R11.11    Abnormal findings on esophagogastroduodenoscopy (EGD) R19.8    Abdominal pain R10.9    Abdominal cramping affecting pregnancy O26.899, R10.9    Decreased fetal movements O36.8190    Absence of fetal movement O36.8190    Vaginal bleeding N93.9     uterine contractions, antepartum, third trimester O47.03    Acute cystitis during pregnancy in third trimester O23.13    Edema R60.9    Term pregnancy Z34.80    Nausea & vomiting R11.2       Past Medical History:        Diagnosis Date    Asthma     Back injury     2 fractures, no sugery    Chronic fatigue syndrome     GERD (gastroesophageal reflux disease)     Mental disorder     anxiety    PCOS (polycystic ovarian syndrome)     Shingles     patient states 2 years ago       Past Surgical History:        Procedure Laterality Date    APPENDECTOMY      TONSILLECTOMY      TONSILLECTOMY AND ADENOIDECTOMY      UPPER GASTROINTESTINAL ENDOSCOPY  5/23/2016    -bx,retained gastric contents       Social History:    Social History     Tobacco Use    Smoking status: Never Smoker    Smokeless tobacco: Never Used   Substance Use Topics    Alcohol use: Not Currently     Comment: vomits with drinking                                Counseling given: Not Answered      Vital Signs (Current):   Vitals:    06/11/19 2042 06/11/19 2044 06/11/19 2046 06/11/19 2050   BP: 122/82 (!) 142/88 (!) 147/78 116/73   Pulse: 82 77 80 79   Resp:       Temp:       TempSrc:       Weight:       Height:                                                  BP Readings from Last 3 Encounters:   06/11/19 116/73   06/06/19 133/63   06/04/19 114/62       NPO Status: Time of last liquid consumption: 0900                        Time of last solid consumption: 0900                        Date of last liquid consumption: 06/11/19                        Date of last solid food consumption: 06/11/19    BMI:   Wt Readings from Last 3 Encounters:   06/11/19 177 lb (80.3 kg)   06/04/19 174 lb (78.9 kg)   05/28/19 171 lb (77.6 kg)     Body mass index is 32.37 kg/m².     CBC:   Lab Results   Component Value Date    WBC 13.0 06/11/2019    RBC 3.86 06/11/2019    HGB 10.5 06/11/2019    HCT 33.1 06/11/2019    MCV 85.8 06/11/2019    RDW 14.2 06/11/2019     06/11/2019       CMP:   Lab Results   Component Value Date     06/05/2019    K 3.8 06/05/2019     06/05/2019    CO2 20 06/05/2019    BUN 6 06/05/2019    CREATININE 0.54 06/05/2019    GFRAA >60 06/05/2019    LABGLOM >60 06/05/2019    GLUCOSE 83 06/05/2019    PROT 6.9 06/05/2019    CALCIUM 8.8 06/05/2019    BILITOT 0.38 06/05/2019    ALKPHOS 149 06/05/2019    AST 11 06/05/2019    ALT <5 06/05/2019       POC Tests: No results for input(s): POCGLU, POCNA, POCK, POCCL, POCBUN, POCHEMO, POCHCT in the last 72 hours. Coags:   Lab Results   Component Value Date    PROTIME 10.2 01/09/2019    INR 1.0 01/09/2019    APTT 32.2 01/09/2019       HCG (If Applicable):   Lab Results   Component Value Date    PREGTESTUR NEGATIVE 02/23/2017    HCGQUANT <1 11/09/2016        ABGs:   Lab Results   Component Value Date    PHART 7.476 10/11/2013    PO2ART 107.5 10/11/2013    QDI3SSU 27.7 10/11/2013    IGQ4YHH 20.0 10/11/2013    E3JNMPDX 98.3 10/11/2013        Type & Screen (If Applicable):  No results found for: LABABO, LABRH    Anesthesia Evaluation   no history of anesthetic complications:   Airway: Mallampati: III  TM distance: >3 FB   Neck ROM: full  Mouth opening: > = 3 FB Dental:      Comment: Poor dentition    Pulmonary:normal exam    (+) asthma:                            Cardiovascular:Negative CV ROS                      Neuro/Psych:   (+) psychiatric history:            GI/Hepatic/Renal:   (+) GERD: poorly controlled,           Endo/Other: Negative Endo/Other ROS                    Abdominal:           Vascular:                                        Anesthesia Plan      epidural     ASA 2             Anesthetic plan and risks discussed with patient.                       MARY ANN Coronado - CRNA   6/11/2019

## 2019-06-12 NOTE — PROGRESS NOTES
06/11/19 2140 -- -- -- -- -- 99 %   06/11/19 2139 -- -- -- -- -- (!) 83 %   06/11/19 2135 -- -- -- -- -- 100 %   06/11/19 2130 -- -- -- -- -- 100 %   06/11/19 2129 114/67 -- -- 76 -- --   06/11/19 2125 -- -- -- -- -- 100 %   06/11/19 2120 -- -- -- -- -- 100 %   06/11/19 2115 -- -- -- -- -- 98 %   06/11/19 2114 -- -- -- -- -- 94 %   06/11/19 2111 118/70 -- -- 73 -- --   06/11/19 2110 -- -- -- -- -- 97 %   06/11/19 2108 -- -- -- -- -- 94 %   06/11/19 2106 111/74 -- -- 80 -- --   06/11/19 2105 -- -- -- -- -- 97 %   06/11/19 2100 -- -- -- -- 20 97 %   06/11/19 2057 122/72 97.5 °F (36.4 °C) Axillary 76 -- --   06/11/19 2055 117/69 -- -- 76 -- 98 %   06/11/19 2053 120/72 -- -- 74 -- --   06/11/19 2050 116/73 -- -- 79 -- 100 %   06/11/19 2046 (!) 147/78 -- -- 80 -- --   06/11/19 2044 (!) 142/88 -- -- 77 -- --   06/11/19 2042 122/82 -- -- 82 -- --   06/11/19 2039 (!) 144/86 -- -- 81 -- --   06/11/19 2034 -- -- -- -- -- 98 %   06/11/19 2033 -- -- -- -- -- 92 %   06/11/19 2000 -- -- -- -- 22 --   06/11/19 1929 133/73 97.8 °F (36.6 °C) Oral 78 18 --   06/11/19 1827 118/80 97.5 °F (36.4 °C) Oral 78 18 --      Abdominal exam: soft, nontender, nondistended, no masses or organomegaly.      Wound: None    Perineum: clean, dry, intact and nontender    Lab Results   Component Value Date    WBC 13.0 (H) 06/11/2019    HGB 10.5 (L) 06/11/2019    HCT 33.1 (L) 06/11/2019    MCV 85.8 06/11/2019     06/11/2019          Current Facility-Administered Medications:     lactated ringers infusion, , Intravenous, Continuous, Deedee Thapa MD    sodium chloride flush 0.9 % injection 10 mL, 10 mL, Intravenous, 2 times per day, Deedee Thapa MD    sodium chloride flush 0.9 % injection 10 mL, 10 mL, Intravenous, PRN, Deedee Thapa MD    rho(D) immune globulin (HYPERRHO S/D) injection 300 mcg, 300 mcg, Intramuscular, Once, Deedee Thapa MD    acetaminophen (TYLENOL) tablet 650 mg, 650 mg, Oral, Q4H PRN, Deedee Thapa MD, 650 mg at 06/12/19 1635    docusate sodium (COLACE) capsule 100 mg, 100 mg, Oral, BID, Tonja Agustin MD, 100 mg at 06/12/19 0850    measles, mumps and rubella vaccine (MMR) injection 0.5 mL, 0.5 mL, Subcutaneous, Prior to discharge, Tonja Agustin MD    Tetanus-Diphth-Acell Pertussis (BOOSTRIX) injection 0.5 mL, 0.5 mL, Intramuscular, Prior to discharge, Tonja Agustin MD    lanolin ointment, , Topical, PRN, Tonja Agustin MD    witch hazel-glycerin (TUCKS) pad, , Topical, PRN, Tonja Agustin MD    benzocaine-menthol (DERMOPLAST) 20-0.5 % spray, , Topical, PRN, Tonja Agustin MD    methylergonovine (METHERGINE) injection 200 mcg, 200 mcg, Intramuscular, Once PRN, Tonja Agustin MD    ibuprofen (ADVIL;MOTRIN) tablet 800 mg, 800 mg, Oral, Q8H, Tonja Agustin MD, 800 mg at 06/12/19 1026    albuterol sulfate  (90 Base) MCG/ACT inhaler 2 puff, 2 puff, Inhalation, Q6H PRN, Tonja Agustin MD, 2 puff at 06/12/19 1022    albuterol (PROVENTIL) nebulizer solution 2.5 mg, 2.5 mg, Nebulization, Q6H PRN, Tonja Agustin MD     Assessment: Status post partum. Stable. Plan:  Continue same management.

## 2019-06-12 NOTE — ANESTHESIA POSTPROCEDURE EVALUATION
Department of Anesthesiology  Postprocedure Note    Patient: Beti James  MRN: [id number]  YOB: 1990  Date of evaluation: 6/12/2019  Time:  8:29 AM     Procedure Summary     Date:  06/11/19 Room / Location:      Anesthesia Start:  2027 Anesthesia Stop:  2216    Procedure:  Labor Analgesia Diagnosis:      Scheduled Providers:   Responsible Provider:  MARY ANN Concepcion CRNA    Anesthesia Type:  epidural ASA Status:  2          Anesthesia Type: epidural    Catrachita Phase I: Catrachita Score: 9    Catrachita Phase II:      Last vitals: Reviewed and per EMR flowsheets. Anesthesia Post Evaluation    Patient location during evaluation: PACU  Patient participation: complete - patient participated  Level of consciousness: awake and alert  Airway patency: patent  Nausea & Vomiting: no nausea and no vomiting  Complications: no  Cardiovascular status: blood pressure returned to baseline  Respiratory status: acceptable and room air  Hydration status: stable  Comments: Patient was very satisfied with epidural analgesia for labor and delivery.

## 2019-06-13 VITALS
BODY MASS INDEX: 32.57 KG/M2 | RESPIRATION RATE: 16 BRPM | DIASTOLIC BLOOD PRESSURE: 73 MMHG | SYSTOLIC BLOOD PRESSURE: 108 MMHG | OXYGEN SATURATION: 99 % | HEART RATE: 81 BPM | WEIGHT: 177 LBS | TEMPERATURE: 97.9 F | HEIGHT: 62 IN

## 2019-06-13 LAB — SURGICAL PATHOLOGY REPORT: NORMAL

## 2019-06-13 PROCEDURE — 6370000000 HC RX 637 (ALT 250 FOR IP): Performed by: OBSTETRICS & GYNECOLOGY

## 2019-06-13 RX ADMIN — IBUPROFEN 800 MG: 800 TABLET, FILM COATED ORAL at 10:32

## 2019-06-13 RX ADMIN — ACETAMINOPHEN 650 MG: 325 TABLET, FILM COATED ORAL at 00:21

## 2019-06-13 RX ADMIN — IBUPROFEN 800 MG: 800 TABLET, FILM COATED ORAL at 03:36

## 2019-06-13 RX ADMIN — DOCUSATE SODIUM 100 MG: 100 CAPSULE, LIQUID FILLED ORAL at 10:32

## 2019-06-13 ASSESSMENT — PAIN SCALES - GENERAL
PAINLEVEL_OUTOF10: 4

## 2019-06-13 NOTE — FLOWSHEET NOTE
Spoke with dr. Jimy Toscano. Notified that pt would like to go home today. Discharge order received.

## 2020-01-20 ENCOUNTER — HOSPITAL ENCOUNTER (OUTPATIENT)
Age: 30
Discharge: HOME | End: 2020-01-20
Payer: COMMERCIAL

## 2020-01-20 ENCOUNTER — HOSPITAL ENCOUNTER (OUTPATIENT)
Dept: ULTRASOUND IMAGING | Age: 30
Discharge: HOME | End: 2020-01-22
Payer: COMMERCIAL

## 2020-01-20 LAB
C-REACTIVE PROTEIN: 0.7 MG/L (ref 0–5)
HCT VFR BLD CALC: 39.7 % (ref 36.3–47.1)
HEMOGLOBIN: 12 G/DL (ref 11.9–15.1)
MCH RBC QN AUTO: 25.2 PG (ref 25.2–33.5)
MCHC RBC AUTO-ENTMCNC: 30.2 G/DL (ref 28.4–34.8)
MCV RBC AUTO: 83.2 FL (ref 82.6–102.9)
NRBC AUTOMATED: 0 PER 100 WBC
PDW BLD-RTO: 15.3 % (ref 11.8–14.4)
PLATELET # BLD: 399 K/UL (ref 138–453)
PMV BLD AUTO: 11 FL (ref 8.1–13.5)
RBC # BLD: 4.77 M/UL (ref 3.95–5.11)
SEDIMENTATION RATE, ERYTHROCYTE: 15 MM (ref 0–20)
WBC # BLD: 8.3 K/UL (ref 3.5–11.3)

## 2020-01-20 PROCEDURE — 76830 TRANSVAGINAL US NON-OB: CPT

## 2020-01-20 PROCEDURE — 86140 C-REACTIVE PROTEIN: CPT

## 2020-01-20 PROCEDURE — 85027 COMPLETE CBC AUTOMATED: CPT

## 2020-01-20 PROCEDURE — 85651 RBC SED RATE NONAUTOMATED: CPT

## 2020-01-20 PROCEDURE — 36415 COLL VENOUS BLD VENIPUNCTURE: CPT

## 2020-02-11 ENCOUNTER — HOSPITAL ENCOUNTER (EMERGENCY)
Age: 30
Discharge: HOME | End: 2020-02-12
Attending: EMERGENCY MEDICINE
Payer: COMMERCIAL

## 2020-02-11 ENCOUNTER — APPOINTMENT (OUTPATIENT)
Dept: CT IMAGING | Age: 30
End: 2020-02-11
Payer: COMMERCIAL

## 2020-02-11 VITALS
OXYGEN SATURATION: 100 % | HEART RATE: 97 BPM | RESPIRATION RATE: 17 BRPM | TEMPERATURE: 96.2 F | SYSTOLIC BLOOD PRESSURE: 118 MMHG | DIASTOLIC BLOOD PRESSURE: 77 MMHG

## 2020-02-11 LAB
-: ABNORMAL
ABSOLUTE EOS #: 0.32 K/UL (ref 0–0.44)
ABSOLUTE IMMATURE GRANULOCYTE: <0.03 K/UL (ref 0–0.3)
ABSOLUTE LYMPH #: 2.17 K/UL (ref 1.1–3.7)
ABSOLUTE MONO #: 0.6 K/UL (ref 0.1–1.2)
ALBUMIN SERPL-MCNC: 3.8 G/DL (ref 3.5–5.2)
ALBUMIN/GLOBULIN RATIO: 1.3 (ref 1–2.5)
ALP BLD-CCNC: 83 U/L (ref 35–104)
ALT SERPL-CCNC: 10 U/L (ref 5–33)
AMORPHOUS: ABNORMAL
ANION GAP SERPL CALCULATED.3IONS-SCNC: 10 MMOL/L (ref 9–17)
AST SERPL-CCNC: 12 U/L
BACTERIA: ABNORMAL
BASOPHILS # BLD: 0 % (ref 0–2)
BASOPHILS ABSOLUTE: <0.03 K/UL (ref 0–0.2)
BILIRUB SERPL-MCNC: 0.15 MG/DL (ref 0.3–1.2)
BILIRUBIN URINE: NEGATIVE
BUN BLDV-MCNC: 7 MG/DL (ref 6–20)
BUN/CREAT BLD: 9 (ref 9–20)
CALCIUM SERPL-MCNC: 8.1 MG/DL (ref 8.6–10.4)
CASTS UA: ABNORMAL /LPF
CHLORIDE BLD-SCNC: 104 MMOL/L (ref 98–107)
CO2: 24 MMOL/L (ref 20–31)
COLOR: YELLOW
COMMENT UA: ABNORMAL
CREAT SERPL-MCNC: 0.79 MG/DL (ref 0.5–0.9)
CRYSTALS, UA: ABNORMAL /HPF
DIFFERENTIAL TYPE: ABNORMAL
EOSINOPHILS RELATIVE PERCENT: 4 % (ref 1–4)
EPITHELIAL CELLS UA: ABNORMAL /HPF (ref 0–25)
GFR AFRICAN AMERICAN: >60 ML/MIN
GFR NON-AFRICAN AMERICAN: >60 ML/MIN
GFR SERPL CREATININE-BSD FRML MDRD: ABNORMAL ML/MIN/{1.73_M2}
GFR SERPL CREATININE-BSD FRML MDRD: ABNORMAL ML/MIN/{1.73_M2}
GLUCOSE BLD-MCNC: 102 MG/DL (ref 70–99)
GLUCOSE URINE: NEGATIVE
HCG(URINE) PREGNANCY TEST: NEGATIVE
HCT VFR BLD CALC: 34.7 % (ref 36.3–47.1)
HEMOGLOBIN: 10.4 G/DL (ref 11.9–15.1)
IMMATURE GRANULOCYTES: 0 %
KETONES, URINE: NEGATIVE
LEUKOCYTE ESTERASE, URINE: ABNORMAL
LIPASE: 30 U/L (ref 13–60)
LYMPHOCYTES # BLD: 26 % (ref 24–43)
MCH RBC QN AUTO: 25.9 PG (ref 25.2–33.5)
MCHC RBC AUTO-ENTMCNC: 30 G/DL (ref 28.4–34.8)
MCV RBC AUTO: 86.5 FL (ref 82.6–102.9)
MONOCYTES # BLD: 7 % (ref 3–12)
MUCUS: ABNORMAL
NITRITE, URINE: NEGATIVE
NRBC AUTOMATED: 0 PER 100 WBC
OTHER OBSERVATIONS UA: ABNORMAL
PDW BLD-RTO: 16.6 % (ref 11.8–14.4)
PH UA: 5 (ref 5–9)
PLATELET # BLD: 325 K/UL (ref 138–453)
PLATELET ESTIMATE: ABNORMAL
PMV BLD AUTO: 10.6 FL (ref 8.1–13.5)
POTASSIUM SERPL-SCNC: 4 MMOL/L (ref 3.7–5.3)
PROTEIN UA: ABNORMAL
RBC # BLD: 4.01 M/UL (ref 3.95–5.11)
RBC # BLD: ABNORMAL 10*6/UL
RBC UA: ABNORMAL /HPF (ref 0–2)
RENAL EPITHELIAL, UA: ABNORMAL /HPF
SEG NEUTROPHILS: 63 % (ref 36–65)
SEGMENTED NEUTROPHILS ABSOLUTE COUNT: 5.23 K/UL (ref 1.5–8.1)
SODIUM BLD-SCNC: 138 MMOL/L (ref 135–144)
SPECIFIC GRAVITY UA: 1.03 (ref 1.01–1.02)
TOTAL PROTEIN: 6.8 G/DL (ref 6.4–8.3)
TRICHOMONAS: ABNORMAL
TURBIDITY: ABNORMAL
URINE HGB: ABNORMAL
UROBILINOGEN, URINE: NORMAL
WBC # BLD: 8.4 K/UL (ref 3.5–11.3)
WBC # BLD: ABNORMAL 10*3/UL
WBC UA: ABNORMAL /HPF (ref 0–5)
YEAST: ABNORMAL

## 2020-02-11 PROCEDURE — 74176 CT ABD & PELVIS W/O CONTRAST: CPT

## 2020-02-11 PROCEDURE — 87086 URINE CULTURE/COLONY COUNT: CPT

## 2020-02-11 PROCEDURE — 87186 SC STD MICRODIL/AGAR DIL: CPT

## 2020-02-11 PROCEDURE — 81001 URINALYSIS AUTO W/SCOPE: CPT

## 2020-02-11 PROCEDURE — 36415 COLL VENOUS BLD VENIPUNCTURE: CPT

## 2020-02-11 PROCEDURE — 81025 URINE PREGNANCY TEST: CPT

## 2020-02-11 PROCEDURE — 6360000002 HC RX W HCPCS: Performed by: EMERGENCY MEDICINE

## 2020-02-11 PROCEDURE — 99284 EMERGENCY DEPT VISIT MOD MDM: CPT

## 2020-02-11 PROCEDURE — 2580000003 HC RX 258: Performed by: EMERGENCY MEDICINE

## 2020-02-11 PROCEDURE — 96374 THER/PROPH/DIAG INJ IV PUSH: CPT

## 2020-02-11 PROCEDURE — 80053 COMPREHEN METABOLIC PANEL: CPT

## 2020-02-11 PROCEDURE — 85025 COMPLETE CBC W/AUTO DIFF WBC: CPT

## 2020-02-11 PROCEDURE — 83690 ASSAY OF LIPASE: CPT

## 2020-02-11 PROCEDURE — 96375 TX/PRO/DX INJ NEW DRUG ADDON: CPT

## 2020-02-11 PROCEDURE — 87088 URINE BACTERIA CULTURE: CPT

## 2020-02-11 RX ORDER — ONDANSETRON 2 MG/ML
4 INJECTION INTRAMUSCULAR; INTRAVENOUS ONCE
Status: COMPLETED | OUTPATIENT
Start: 2020-02-11 | End: 2020-02-11

## 2020-02-11 RX ORDER — 0.9 % SODIUM CHLORIDE 0.9 %
1000 INTRAVENOUS SOLUTION INTRAVENOUS ONCE
Status: COMPLETED | OUTPATIENT
Start: 2020-02-11 | End: 2020-02-12

## 2020-02-11 RX ORDER — KETOROLAC TROMETHAMINE 15 MG/ML
15 INJECTION, SOLUTION INTRAMUSCULAR; INTRAVENOUS ONCE
Status: COMPLETED | OUTPATIENT
Start: 2020-02-11 | End: 2020-02-11

## 2020-02-11 RX ADMIN — ONDANSETRON 4 MG: 2 INJECTION INTRAMUSCULAR; INTRAVENOUS at 23:18

## 2020-02-11 RX ADMIN — KETOROLAC TROMETHAMINE 15 MG: 15 INJECTION, SOLUTION INTRAMUSCULAR; INTRAVENOUS at 23:18

## 2020-02-11 RX ADMIN — SODIUM CHLORIDE 1000 ML: 9 INJECTION, SOLUTION INTRAVENOUS at 23:18

## 2020-02-11 ASSESSMENT — PAIN DESCRIPTION - DESCRIPTORS: DESCRIPTORS: THROBBING

## 2020-02-11 ASSESSMENT — PAIN DESCRIPTION - PAIN TYPE: TYPE: ACUTE PAIN

## 2020-02-11 ASSESSMENT — PAIN DESCRIPTION - FREQUENCY: FREQUENCY: CONTINUOUS

## 2020-02-11 ASSESSMENT — PAIN DESCRIPTION - LOCATION: LOCATION: ABDOMEN

## 2020-02-11 ASSESSMENT — PAIN DESCRIPTION - ORIENTATION: ORIENTATION: LOWER

## 2020-02-11 ASSESSMENT — PAIN SCALES - GENERAL: PAINLEVEL_OUTOF10: 8

## 2020-02-12 PROCEDURE — 6360000002 HC RX W HCPCS: Performed by: EMERGENCY MEDICINE

## 2020-02-12 PROCEDURE — 2580000003 HC RX 258: Performed by: EMERGENCY MEDICINE

## 2020-02-12 PROCEDURE — 96375 TX/PRO/DX INJ NEW DRUG ADDON: CPT

## 2020-02-12 RX ORDER — CEPHALEXIN 500 MG/1
500 CAPSULE ORAL 2 TIMES DAILY
Qty: 14 CAPSULE | Refills: 0 | Status: SHIPPED | OUTPATIENT
Start: 2020-02-12 | End: 2020-02-19

## 2020-02-12 RX ADMIN — CEFTRIAXONE 1 G: 1 INJECTION, POWDER, FOR SOLUTION INTRAMUSCULAR; INTRAVENOUS at 00:29

## 2020-02-12 ASSESSMENT — ENCOUNTER SYMPTOMS
ABDOMINAL PAIN: 1
EYE PAIN: 0
SHORTNESS OF BREATH: 0

## 2020-02-12 NOTE — ED PROVIDER NOTES
Nausea or VomitingHistorical Med      acetaminophen (APAP EXTRA STRENGTH) 500 MG tablet Take 2 tablets by mouth every 6 hours as needed for Pain, Disp-120 tablet, R-0      amphetamine-dextroamphetamine (ADDERALL XR) 20 MG XR capsule Take 10 mg by mouth 2 times daily. Fabiana Noriega Historical Med      albuterol (PROVENTIL HFA;VENTOLIN HFA) 108 (90 BASE) MCG/ACT inhaler Inhale 2 puffs into the lungs every 6 hours as needed. albuterol (PROVENTIL) (2.5 MG/3ML) 0.083% nebulizer solution Take 2.5 mg by nebulization every 6 hours as needed.              ALLERGIES     Pcn [penicillins]; Sulfa antibiotics; and Tape [adhesive tape]    FAMILY HISTORY       Family History   Problem Relation Age of Onset    Heart Attack Mother     Stroke Father         SOCIAL HISTORY       Social History     Socioeconomic History    Marital status: Single     Spouse name: Not on file    Number of children: Not on file    Years of education: Not on file    Highest education level: Not on file   Occupational History    Not on file   Social Needs    Financial resource strain: Not on file    Food insecurity:     Worry: Not on file     Inability: Not on file    Transportation needs:     Medical: Not on file     Non-medical: Not on file   Tobacco Use    Smoking status: Never Smoker    Smokeless tobacco: Never Used   Substance and Sexual Activity    Alcohol use: Not Currently     Comment: vomits with drinking    Drug use: Never    Sexual activity: Yes     Partners: Male   Lifestyle    Physical activity:     Days per week: Not on file     Minutes per session: Not on file    Stress: Not on file   Relationships    Social connections:     Talks on phone: Not on file     Gets together: Not on file     Attends Rastafari service: Not on file     Active member of club or organization: Not on file     Attends meetings of clubs or organizations: Not on file     Relationship status: Not on file    Intimate partner violence:     Fear of current or ex

## 2020-02-13 LAB
CULTURE: ABNORMAL
Lab: ABNORMAL
SPECIMEN DESCRIPTION: ABNORMAL

## 2020-03-05 ENCOUNTER — HOSPITAL ENCOUNTER (OUTPATIENT)
Age: 30
Discharge: HOME | End: 2020-03-05
Payer: COMMERCIAL

## 2020-03-05 LAB — HCG QUALITATIVE: NEGATIVE

## 2020-03-05 PROCEDURE — 84703 CHORIONIC GONADOTROPIN ASSAY: CPT

## 2020-03-05 PROCEDURE — 36415 COLL VENOUS BLD VENIPUNCTURE: CPT

## 2020-09-21 ENCOUNTER — NURSE ONLY (OUTPATIENT)
Dept: LAB | Age: 30
End: 2020-09-21

## 2020-12-10 PROBLEM — M79.7 FIBROMYALGIA: Status: ACTIVE | Noted: 2020-12-10

## 2020-12-10 PROBLEM — E53.8 B12 DEFICIENCY: Status: ACTIVE | Noted: 2020-12-10

## 2020-12-10 PROBLEM — Z03.73 FETAL ANOMALY SUSPECTED BUT NOT FOUND: Status: ACTIVE | Noted: 2019-02-25

## 2020-12-10 PROBLEM — F32.A DEPRESSION: Status: ACTIVE | Noted: 2020-12-10

## 2020-12-10 PROBLEM — G43.909 MIGRAINE HEADACHE: Status: ACTIVE | Noted: 2020-12-10

## 2020-12-10 PROBLEM — F98.8 ATTENTION DEFICIT DISORDER: Status: ACTIVE | Noted: 2020-12-10

## 2020-12-10 PROBLEM — O36.5930 POOR FETAL GROWTH AFFECTING MANAGEMENT OF MOTHER IN THIRD TRIMESTER: Status: ACTIVE | Noted: 2019-05-23

## 2020-12-10 PROBLEM — F90.9 OVERACTIVE CHILD: Status: ACTIVE | Noted: 2020-12-10

## 2020-12-10 PROBLEM — E28.2 POLYCYSTIC OVARIES: Status: ACTIVE | Noted: 2020-12-10

## 2020-12-10 PROBLEM — F41.9 ANXIETY: Status: ACTIVE | Noted: 2020-12-10

## 2020-12-10 PROBLEM — G93.32 CHRONIC FATIGUE SYNDROME: Status: ACTIVE | Noted: 2020-12-10

## 2021-09-23 PROBLEM — L02.31 ABSCESS, GLUTEAL, LEFT: Status: ACTIVE | Noted: 2021-09-23

## 2021-09-24 PROBLEM — L02.91 ABSCESS: Status: ACTIVE | Noted: 2021-09-24

## 2022-02-09 NOTE — PROGRESS NOTES
Additional History: Patient is here today for a bandage change on his left cheek. Progress note: 25year-old  at 36 weeks 4 days who had a normal biophysical profile earlier today for IUGR. Presented with contractions. The patient was observed for a couple hours with no real cervical change. Findings on urinalysis are consistent with a urinary tract infection. Patient was instructed to fill antibiotics take them and drink plenty of fluids and a follow-up with Dr. Tristian Roy in the near future.   Assessment/plan:  contractions and acute cystitis

## 2022-02-21 PROBLEM — R10.11 RIGHT UPPER QUADRANT ABDOMINAL PAIN: Status: ACTIVE | Noted: 2022-02-21

## 2022-02-21 PROBLEM — F43.10 PTSD (POST-TRAUMATIC STRESS DISORDER): Status: ACTIVE | Noted: 2022-02-21

## 2022-02-21 PROBLEM — K80.10 CALCULUS OF GALLBLADDER WITH CHRONIC CHOLECYSTITIS WITHOUT OBSTRUCTION: Status: ACTIVE | Noted: 2022-02-21

## 2022-06-09 PROBLEM — J45.909 ASTHMA: Status: ACTIVE | Noted: 2019-04-25
